# Patient Record
Sex: FEMALE | Race: WHITE | NOT HISPANIC OR LATINO | Employment: OTHER | URBAN - METROPOLITAN AREA
[De-identification: names, ages, dates, MRNs, and addresses within clinical notes are randomized per-mention and may not be internally consistent; named-entity substitution may affect disease eponyms.]

---

## 2017-02-24 ENCOUNTER — CONVERSION ENCOUNTER (OUTPATIENT)
Dept: ORTHOPEDIC SURGERY | Facility: CLINIC | Age: 62
End: 2017-02-24

## 2019-06-04 VITALS — HEART RATE: 84 BPM | WEIGHT: 135 LBS | SYSTOLIC BLOOD PRESSURE: 127 MMHG | DIASTOLIC BLOOD PRESSURE: 82 MMHG

## 2019-12-12 ENCOUNTER — ON CAMPUS - OUTPATIENT (AMBULATORY)
Dept: URBAN - METROPOLITAN AREA HOSPITAL 2 | Facility: HOSPITAL | Age: 64
End: 2019-12-12

## 2019-12-12 ENCOUNTER — OFFICE (AMBULATORY)
Dept: URBAN - METROPOLITAN AREA PATHOLOGY 4 | Facility: PATHOLOGY | Age: 64
End: 2019-12-12

## 2019-12-12 VITALS
HEART RATE: 78 BPM | DIASTOLIC BLOOD PRESSURE: 74 MMHG | SYSTOLIC BLOOD PRESSURE: 98 MMHG | TEMPERATURE: 98.2 F | SYSTOLIC BLOOD PRESSURE: 115 MMHG | SYSTOLIC BLOOD PRESSURE: 131 MMHG | HEART RATE: 88 BPM | DIASTOLIC BLOOD PRESSURE: 75 MMHG | OXYGEN SATURATION: 98 % | OXYGEN SATURATION: 99 % | HEIGHT: 63 IN | HEART RATE: 72 BPM | RESPIRATION RATE: 16 BRPM | SYSTOLIC BLOOD PRESSURE: 107 MMHG | WEIGHT: 150 LBS | SYSTOLIC BLOOD PRESSURE: 110 MMHG | DIASTOLIC BLOOD PRESSURE: 61 MMHG | OXYGEN SATURATION: 97 % | RESPIRATION RATE: 17 BRPM | HEART RATE: 77 BPM | HEART RATE: 90 BPM | DIASTOLIC BLOOD PRESSURE: 84 MMHG | SYSTOLIC BLOOD PRESSURE: 101 MMHG | DIASTOLIC BLOOD PRESSURE: 65 MMHG | OXYGEN SATURATION: 96 %

## 2019-12-12 DIAGNOSIS — D12.2 BENIGN NEOPLASM OF ASCENDING COLON: ICD-10-CM

## 2019-12-12 DIAGNOSIS — Z86.010 PERSONAL HISTORY OF COLONIC POLYPS: ICD-10-CM

## 2019-12-12 LAB
GI HISTOLOGY: A. UNSPECIFIED: (no result)
GI HISTOLOGY: PDF REPORT: (no result)

## 2019-12-12 PROCEDURE — 45385 COLONOSCOPY W/LESION REMOVAL: CPT | Mod: PT | Performed by: INTERNAL MEDICINE

## 2019-12-12 PROCEDURE — 88305 TISSUE EXAM BY PATHOLOGIST: CPT | Mod: 26 | Performed by: INTERNAL MEDICINE

## 2019-12-12 PROCEDURE — 88305 TISSUE EXAM BY PATHOLOGIST: CPT | Performed by: INTERNAL MEDICINE

## 2019-12-13 ENCOUNTER — LAB REQUISITION (OUTPATIENT)
Dept: LAB | Facility: HOSPITAL | Age: 64
End: 2019-12-13

## 2019-12-13 DIAGNOSIS — Z86.010 PERSONAL HISTORY OF COLONIC POLYPS: ICD-10-CM

## 2019-12-16 LAB
LAB AP CASE REPORT: NORMAL
PATH REPORT.FINAL DX SPEC: NORMAL
PATH REPORT.GROSS SPEC: NORMAL

## 2020-10-29 ENCOUNTER — LAB (OUTPATIENT)
Dept: LAB | Facility: HOSPITAL | Age: 65
End: 2020-10-29

## 2020-10-29 ENCOUNTER — TRANSCRIBE ORDERS (OUTPATIENT)
Dept: ADMINISTRATIVE | Facility: HOSPITAL | Age: 65
End: 2020-10-29

## 2020-10-29 DIAGNOSIS — G47.33 OBSTRUCTIVE SLEEP APNEA (ADULT) (PEDIATRIC): ICD-10-CM

## 2020-10-29 DIAGNOSIS — Z90.5 ACQUIRED ABSENCE OF KIDNEY: ICD-10-CM

## 2020-10-29 DIAGNOSIS — R53.83 TIREDNESS: ICD-10-CM

## 2020-10-29 DIAGNOSIS — E78.81 LIPOID DERMATOARTHRITIS: Primary | ICD-10-CM

## 2020-10-29 DIAGNOSIS — E55.9 VITAMIN D DEFICIENCY: ICD-10-CM

## 2020-10-29 DIAGNOSIS — F34.1 DYSTHYMIC DISORDER: ICD-10-CM

## 2020-10-29 DIAGNOSIS — E78.81 LIPOID DERMATOARTHRITIS: ICD-10-CM

## 2020-10-29 LAB
25(OH)D3 SERPL-MCNC: 52.1 NG/ML (ref 30–100)
ALBUMIN SERPL-MCNC: 4.7 G/DL (ref 3.5–5.2)
ALBUMIN/GLOB SERPL: 2.4 G/DL
ALP SERPL-CCNC: 84 U/L (ref 39–117)
ALT SERPL W P-5'-P-CCNC: 16 U/L (ref 1–33)
ANION GAP SERPL CALCULATED.3IONS-SCNC: 7.7 MMOL/L (ref 5–15)
AST SERPL-CCNC: 24 U/L (ref 1–32)
BILIRUB SERPL-MCNC: 0.3 MG/DL (ref 0–1.2)
BUN SERPL-MCNC: 10 MG/DL (ref 8–23)
BUN/CREAT SERPL: 10.8 (ref 7–25)
CALCIUM SPEC-SCNC: 10 MG/DL (ref 8.6–10.5)
CHLORIDE SERPL-SCNC: 102 MMOL/L (ref 98–107)
CHOLEST SERPL-MCNC: 211 MG/DL (ref 0–200)
CO2 SERPL-SCNC: 31.3 MMOL/L (ref 22–29)
CREAT SERPL-MCNC: 0.93 MG/DL (ref 0.57–1)
DEPRECATED RDW RBC AUTO: 38.5 FL (ref 37–54)
ERYTHROCYTE [DISTWIDTH] IN BLOOD BY AUTOMATED COUNT: 11.9 % (ref 12.3–15.4)
GFR SERPL CREATININE-BSD FRML MDRD: 60 ML/MIN/1.73
GLOBULIN UR ELPH-MCNC: 2 GM/DL
GLUCOSE SERPL-MCNC: 90 MG/DL (ref 65–99)
HCT VFR BLD AUTO: 36.8 % (ref 34–46.6)
HDLC SERPL-MCNC: 58 MG/DL (ref 40–60)
HGB BLD-MCNC: 12 G/DL (ref 12–15.9)
IRON 24H UR-MRATE: 59 MCG/DL (ref 37–145)
LDLC SERPL CALC-MCNC: 119 MG/DL (ref 0–100)
LDLC/HDLC SERPL: 1.97 {RATIO}
MAGNESIUM SERPL-MCNC: 2.3 MG/DL (ref 1.6–2.4)
MCH RBC QN AUTO: 29.1 PG (ref 26.6–33)
MCHC RBC AUTO-ENTMCNC: 32.6 G/DL (ref 31.5–35.7)
MCV RBC AUTO: 89.1 FL (ref 79–97)
PLATELET # BLD AUTO: 330 10*3/MM3 (ref 140–450)
PMV BLD AUTO: 9.6 FL (ref 6–12)
POTASSIUM SERPL-SCNC: 4.7 MMOL/L (ref 3.5–5.2)
PROT SERPL-MCNC: 6.7 G/DL (ref 6–8.5)
RBC # BLD AUTO: 4.13 10*6/MM3 (ref 3.77–5.28)
SODIUM SERPL-SCNC: 141 MMOL/L (ref 136–145)
TRIGL SERPL-MCNC: 195 MG/DL (ref 0–150)
TSH SERPL DL<=0.05 MIU/L-ACNC: 2.06 UIU/ML (ref 0.27–4.2)
VIT B12 BLD-MCNC: 844 PG/ML (ref 211–946)
VLDLC SERPL-MCNC: 34 MG/DL (ref 5–40)
WBC # BLD AUTO: 6.98 10*3/MM3 (ref 3.4–10.8)

## 2020-10-29 PROCEDURE — 85027 COMPLETE CBC AUTOMATED: CPT

## 2020-10-29 PROCEDURE — 82607 VITAMIN B-12: CPT

## 2020-10-29 PROCEDURE — 84443 ASSAY THYROID STIM HORMONE: CPT

## 2020-10-29 PROCEDURE — 83540 ASSAY OF IRON: CPT

## 2020-10-29 PROCEDURE — 82306 VITAMIN D 25 HYDROXY: CPT

## 2020-10-29 PROCEDURE — 80053 COMPREHEN METABOLIC PANEL: CPT

## 2020-10-29 PROCEDURE — 83735 ASSAY OF MAGNESIUM: CPT

## 2020-10-29 PROCEDURE — 80061 LIPID PANEL: CPT

## 2020-10-29 PROCEDURE — 36415 COLL VENOUS BLD VENIPUNCTURE: CPT

## 2021-08-23 ENCOUNTER — TELEPHONE (OUTPATIENT)
Dept: NEUROLOGY | Facility: CLINIC | Age: 66
End: 2021-08-23

## 2022-01-29 ENCOUNTER — LAB (OUTPATIENT)
Dept: LAB | Facility: HOSPITAL | Age: 67
End: 2022-01-29

## 2022-01-29 ENCOUNTER — TRANSCRIBE ORDERS (OUTPATIENT)
Dept: ADMINISTRATIVE | Facility: HOSPITAL | Age: 67
End: 2022-01-29

## 2022-01-29 DIAGNOSIS — E78.81 LIPOID DERMATOARTHRITIS: ICD-10-CM

## 2022-01-29 DIAGNOSIS — Z90.5 SINGLE KIDNEY: ICD-10-CM

## 2022-01-29 DIAGNOSIS — F34.1 DYSTHYMIC DISORDER: ICD-10-CM

## 2022-01-29 DIAGNOSIS — E55.9 VITAMIN D DEFICIENCY: Primary | ICD-10-CM

## 2022-01-29 DIAGNOSIS — E55.9 VITAMIN D DEFICIENCY: ICD-10-CM

## 2022-01-29 LAB
25(OH)D3 SERPL-MCNC: 38.1 NG/ML (ref 30–100)
ALBUMIN SERPL-MCNC: 4.5 G/DL (ref 3.5–5.2)
ALBUMIN/GLOB SERPL: 2 G/DL
ALP SERPL-CCNC: 85 U/L (ref 39–117)
ALT SERPL W P-5'-P-CCNC: 15 U/L (ref 1–33)
ANION GAP SERPL CALCULATED.3IONS-SCNC: 10.8 MMOL/L (ref 5–15)
AST SERPL-CCNC: 19 U/L (ref 1–32)
BASOPHILS # BLD AUTO: 0.05 10*3/MM3 (ref 0–0.2)
BASOPHILS NFR BLD AUTO: 0.8 % (ref 0–1.5)
BILIRUB SERPL-MCNC: 0.2 MG/DL (ref 0–1.2)
BUN SERPL-MCNC: 10 MG/DL (ref 8–23)
BUN/CREAT SERPL: 9.3 (ref 7–25)
CALCIUM SPEC-SCNC: 9.8 MG/DL (ref 8.6–10.5)
CHLORIDE SERPL-SCNC: 102 MMOL/L (ref 98–107)
CHOLEST SERPL-MCNC: 213 MG/DL (ref 0–200)
CO2 SERPL-SCNC: 28.2 MMOL/L (ref 22–29)
CREAT SERPL-MCNC: 1.07 MG/DL (ref 0.57–1)
DEPRECATED RDW RBC AUTO: 36.6 FL (ref 37–54)
EOSINOPHIL # BLD AUTO: 0.2 10*3/MM3 (ref 0–0.4)
EOSINOPHIL NFR BLD AUTO: 3.2 % (ref 0.3–6.2)
ERYTHROCYTE [DISTWIDTH] IN BLOOD BY AUTOMATED COUNT: 11.4 % (ref 12.3–15.4)
GFR SERPL CREATININE-BSD FRML MDRD: 51 ML/MIN/1.73
GLOBULIN UR ELPH-MCNC: 2.2 GM/DL
GLUCOSE SERPL-MCNC: 81 MG/DL (ref 65–99)
HCT VFR BLD AUTO: 38.7 % (ref 34–46.6)
HDLC SERPL-MCNC: 48 MG/DL (ref 40–60)
HGB BLD-MCNC: 13.1 G/DL (ref 12–15.9)
IMM GRANULOCYTES # BLD AUTO: 0.02 10*3/MM3 (ref 0–0.05)
IMM GRANULOCYTES NFR BLD AUTO: 0.3 % (ref 0–0.5)
LDLC SERPL CALC-MCNC: 106 MG/DL (ref 0–100)
LDLC/HDLC SERPL: 1.99 {RATIO}
LYMPHOCYTES # BLD AUTO: 2.27 10*3/MM3 (ref 0.7–3.1)
LYMPHOCYTES NFR BLD AUTO: 36.4 % (ref 19.6–45.3)
MCH RBC QN AUTO: 30 PG (ref 26.6–33)
MCHC RBC AUTO-ENTMCNC: 33.9 G/DL (ref 31.5–35.7)
MCV RBC AUTO: 88.6 FL (ref 79–97)
MONOCYTES # BLD AUTO: 0.5 10*3/MM3 (ref 0.1–0.9)
MONOCYTES NFR BLD AUTO: 8 % (ref 5–12)
NEUTROPHILS NFR BLD AUTO: 3.19 10*3/MM3 (ref 1.7–7)
NEUTROPHILS NFR BLD AUTO: 51.3 % (ref 42.7–76)
NRBC BLD AUTO-RTO: 0 /100 WBC (ref 0–0.2)
PLATELET # BLD AUTO: 325 10*3/MM3 (ref 140–450)
PMV BLD AUTO: 9.8 FL (ref 6–12)
POTASSIUM SERPL-SCNC: 4.3 MMOL/L (ref 3.5–5.2)
PROT SERPL-MCNC: 6.7 G/DL (ref 6–8.5)
RBC # BLD AUTO: 4.37 10*6/MM3 (ref 3.77–5.28)
SODIUM SERPL-SCNC: 141 MMOL/L (ref 136–145)
TRIGL SERPL-MCNC: 347 MG/DL (ref 0–150)
VLDLC SERPL-MCNC: 59 MG/DL (ref 5–40)
WBC NRBC COR # BLD: 6.23 10*3/MM3 (ref 3.4–10.8)

## 2022-01-29 PROCEDURE — 82306 VITAMIN D 25 HYDROXY: CPT

## 2022-01-29 PROCEDURE — 85025 COMPLETE CBC W/AUTO DIFF WBC: CPT

## 2022-01-29 PROCEDURE — 36415 COLL VENOUS BLD VENIPUNCTURE: CPT

## 2022-01-29 PROCEDURE — 80061 LIPID PANEL: CPT

## 2022-01-29 PROCEDURE — 80053 COMPREHEN METABOLIC PANEL: CPT

## 2022-06-17 ENCOUNTER — TRANSCRIBE ORDERS (OUTPATIENT)
Dept: ADMINISTRATIVE | Facility: HOSPITAL | Age: 67
End: 2022-06-17

## 2022-06-17 DIAGNOSIS — Z87.442 HISTORY OF KIDNEY STONES: ICD-10-CM

## 2022-06-17 DIAGNOSIS — M54.41 ACUTE BILATERAL LOW BACK PAIN WITH RIGHT-SIDED SCIATICA: Primary | ICD-10-CM

## 2022-06-20 ENCOUNTER — HOSPITAL ENCOUNTER (OUTPATIENT)
Dept: CT IMAGING | Facility: HOSPITAL | Age: 67
Discharge: HOME | End: 2022-06-20
Admitting: FAMILY MEDICINE

## 2022-06-20 DIAGNOSIS — M54.41 ACUTE BILATERAL LOW BACK PAIN WITH RIGHT-SIDED SCIATICA: ICD-10-CM

## 2022-06-20 DIAGNOSIS — Z87.442 HISTORY OF KIDNEY STONES: ICD-10-CM

## 2022-06-20 LAB
CREAT BLDA-MCNC: 0.2 MG/DL (ref 0.6–1.3)
EGFRCR SERPLBLD CKD-EPI 2021: 127.6 ML/MIN/1.73

## 2022-06-20 PROCEDURE — 82565 ASSAY OF CREATININE: CPT

## 2022-06-20 PROCEDURE — 0 IOPAMIDOL PER 1 ML: Performed by: FAMILY MEDICINE

## 2022-06-20 PROCEDURE — 74177 CT ABD & PELVIS W/CONTRAST: CPT

## 2022-06-20 RX ADMIN — IOPAMIDOL 100 ML: 755 INJECTION, SOLUTION INTRAVENOUS at 12:53

## 2022-06-30 ENCOUNTER — TRANSCRIBE ORDERS (OUTPATIENT)
Dept: ADMINISTRATIVE | Facility: HOSPITAL | Age: 67
End: 2022-06-30

## 2022-06-30 ENCOUNTER — LAB (OUTPATIENT)
Dept: LAB | Facility: HOSPITAL | Age: 67
End: 2022-06-30

## 2022-06-30 ENCOUNTER — OFFICE (AMBULATORY)
Dept: URBAN - METROPOLITAN AREA CLINIC 64 | Facility: CLINIC | Age: 67
End: 2022-06-30

## 2022-06-30 VITALS
HEART RATE: 76 BPM | SYSTOLIC BLOOD PRESSURE: 116 MMHG | DIASTOLIC BLOOD PRESSURE: 67 MMHG | HEIGHT: 63 IN | WEIGHT: 159 LBS

## 2022-06-30 DIAGNOSIS — Z90.5 ACQUIRED ABSENCE OF KIDNEY: ICD-10-CM

## 2022-06-30 DIAGNOSIS — G47.33 OBSTRUCTIVE SLEEP APNEA (ADULT) (PEDIATRIC): ICD-10-CM

## 2022-06-30 DIAGNOSIS — M86.9 SAPHO SYNDROME: ICD-10-CM

## 2022-06-30 DIAGNOSIS — E78.81 LIPOID DERMATOARTHRITIS: ICD-10-CM

## 2022-06-30 DIAGNOSIS — J45.909 ALLERGIC ASTHMA WITH STATED CAUSE: ICD-10-CM

## 2022-06-30 DIAGNOSIS — E55.9 AVITAMINOSIS D: ICD-10-CM

## 2022-06-30 DIAGNOSIS — L40.3 SAPHO SYNDROME: ICD-10-CM

## 2022-06-30 DIAGNOSIS — M65.9 SAPHO SYNDROME: ICD-10-CM

## 2022-06-30 DIAGNOSIS — M85.80 SAPHO SYNDROME: ICD-10-CM

## 2022-06-30 DIAGNOSIS — Z00.00 ROUTINE GENERAL MEDICAL EXAMINATION AT A HEALTH CARE FACILITY: ICD-10-CM

## 2022-06-30 DIAGNOSIS — F34.1 DYSTHYMIC DISORDER: ICD-10-CM

## 2022-06-30 DIAGNOSIS — K76.0 FATTY (CHANGE OF) LIVER, NOT ELSEWHERE CLASSIFIED: ICD-10-CM

## 2022-06-30 DIAGNOSIS — E55.9 AVITAMINOSIS D: Primary | ICD-10-CM

## 2022-06-30 DIAGNOSIS — L70.9 SAPHO SYNDROME: ICD-10-CM

## 2022-06-30 LAB
25(OH)D3 SERPL-MCNC: 55.3 NG/ML (ref 30–100)
ALBUMIN SERPL-MCNC: 4.5 G/DL (ref 3.5–5.2)
ALBUMIN/GLOB SERPL: 2.1 G/DL
ALP SERPL-CCNC: 88 U/L (ref 39–117)
ALT SERPL W P-5'-P-CCNC: 11 U/L (ref 1–33)
ANION GAP SERPL CALCULATED.3IONS-SCNC: 7.9 MMOL/L (ref 5–15)
AST SERPL-CCNC: 13 U/L (ref 1–32)
BASOPHILS # BLD AUTO: 0.06 10*3/MM3 (ref 0–0.2)
BASOPHILS NFR BLD AUTO: 1.1 % (ref 0–1.5)
BILIRUB SERPL-MCNC: 0.3 MG/DL (ref 0–1.2)
BUN SERPL-MCNC: 12 MG/DL (ref 8–23)
BUN/CREAT SERPL: 13 (ref 7–25)
CALCIUM SPEC-SCNC: 10.2 MG/DL (ref 8.6–10.5)
CHLORIDE SERPL-SCNC: 104 MMOL/L (ref 98–107)
CHOLEST SERPL-MCNC: 220 MG/DL (ref 0–200)
CO2 SERPL-SCNC: 28.1 MMOL/L (ref 22–29)
CREAT SERPL-MCNC: 0.92 MG/DL (ref 0.57–1)
DEPRECATED RDW RBC AUTO: 36.6 FL (ref 37–54)
EGFRCR SERPLBLD CKD-EPI 2021: 68 ML/MIN/1.73
EOSINOPHIL # BLD AUTO: 0.27 10*3/MM3 (ref 0–0.4)
EOSINOPHIL NFR BLD AUTO: 4.8 % (ref 0.3–6.2)
ERYTHROCYTE [DISTWIDTH] IN BLOOD BY AUTOMATED COUNT: 11.5 % (ref 12.3–15.4)
GLOBULIN UR ELPH-MCNC: 2.1 GM/DL
GLUCOSE SERPL-MCNC: 83 MG/DL (ref 65–99)
HCT VFR BLD AUTO: 37.5 % (ref 34–46.6)
HDLC SERPL-MCNC: 50 MG/DL (ref 40–60)
HGB BLD-MCNC: 12.5 G/DL (ref 12–15.9)
IMM GRANULOCYTES # BLD AUTO: 0.02 10*3/MM3 (ref 0–0.05)
IMM GRANULOCYTES NFR BLD AUTO: 0.4 % (ref 0–0.5)
LDLC SERPL CALC-MCNC: 135 MG/DL (ref 0–100)
LDLC/HDLC SERPL: 2.61 {RATIO}
LYMPHOCYTES # BLD AUTO: 1.98 10*3/MM3 (ref 0.7–3.1)
LYMPHOCYTES NFR BLD AUTO: 35.2 % (ref 19.6–45.3)
MCH RBC QN AUTO: 29.8 PG (ref 26.6–33)
MCHC RBC AUTO-ENTMCNC: 33.3 G/DL (ref 31.5–35.7)
MCV RBC AUTO: 89.3 FL (ref 79–97)
MONOCYTES # BLD AUTO: 0.6 10*3/MM3 (ref 0.1–0.9)
MONOCYTES NFR BLD AUTO: 10.7 % (ref 5–12)
NEUTROPHILS NFR BLD AUTO: 2.69 10*3/MM3 (ref 1.7–7)
NEUTROPHILS NFR BLD AUTO: 47.8 % (ref 42.7–76)
NRBC BLD AUTO-RTO: 0 /100 WBC (ref 0–0.2)
PLATELET # BLD AUTO: 282 10*3/MM3 (ref 140–450)
PMV BLD AUTO: 9.7 FL (ref 6–12)
POTASSIUM SERPL-SCNC: 4.7 MMOL/L (ref 3.5–5.2)
PROT SERPL-MCNC: 6.6 G/DL (ref 6–8.5)
RBC # BLD AUTO: 4.2 10*6/MM3 (ref 3.77–5.28)
SODIUM SERPL-SCNC: 140 MMOL/L (ref 136–145)
TRIGL SERPL-MCNC: 197 MG/DL (ref 0–150)
TSH SERPL DL<=0.05 MIU/L-ACNC: 2.83 UIU/ML (ref 0.27–4.2)
VLDLC SERPL-MCNC: 35 MG/DL (ref 5–40)
WBC NRBC COR # BLD: 5.62 10*3/MM3 (ref 3.4–10.8)

## 2022-06-30 PROCEDURE — 80061 LIPID PANEL: CPT

## 2022-06-30 PROCEDURE — 36415 COLL VENOUS BLD VENIPUNCTURE: CPT

## 2022-06-30 PROCEDURE — 80053 COMPREHEN METABOLIC PANEL: CPT

## 2022-06-30 PROCEDURE — 85025 COMPLETE CBC W/AUTO DIFF WBC: CPT

## 2022-06-30 PROCEDURE — 99213 OFFICE O/P EST LOW 20 MIN: CPT | Performed by: NURSE PRACTITIONER

## 2022-06-30 PROCEDURE — 84443 ASSAY THYROID STIM HORMONE: CPT

## 2022-06-30 PROCEDURE — 82306 VITAMIN D 25 HYDROXY: CPT

## 2023-05-01 ENCOUNTER — TRANSCRIBE ORDERS (OUTPATIENT)
Dept: ADMINISTRATIVE | Facility: HOSPITAL | Age: 68
End: 2023-05-01
Payer: MEDICARE

## 2023-05-01 ENCOUNTER — LAB (OUTPATIENT)
Dept: LAB | Facility: HOSPITAL | Age: 68
End: 2023-05-01
Payer: MEDICARE

## 2023-05-01 DIAGNOSIS — G47.33 OBSTRUCTIVE SLEEP APNEA (ADULT) (PEDIATRIC): ICD-10-CM

## 2023-05-01 DIAGNOSIS — N95.1 SYMPTOMATIC MENOPAUSAL OR FEMALE CLIMACTERIC STATES: ICD-10-CM

## 2023-05-01 DIAGNOSIS — E78.81 LIPOID DERMATOARTHRITIS: ICD-10-CM

## 2023-05-01 DIAGNOSIS — E55.9 AVITAMINOSIS D: Primary | ICD-10-CM

## 2023-05-01 DIAGNOSIS — F34.1 DYSTHYMIC DISORDER: ICD-10-CM

## 2023-05-01 DIAGNOSIS — E55.9 AVITAMINOSIS D: ICD-10-CM

## 2023-05-01 DIAGNOSIS — Z90.5 ACQUIRED ABSENCE OF KIDNEY: ICD-10-CM

## 2023-05-01 LAB
25(OH)D3 SERPL-MCNC: 49.3 NG/ML (ref 30–100)
ALBUMIN SERPL-MCNC: 4.5 G/DL (ref 3.5–5.2)
ALBUMIN/GLOB SERPL: 1.9 G/DL
ALP SERPL-CCNC: 91 U/L (ref 39–117)
ALT SERPL W P-5'-P-CCNC: 13 U/L (ref 1–33)
ANION GAP SERPL CALCULATED.3IONS-SCNC: 8 MMOL/L (ref 5–15)
AST SERPL-CCNC: 22 U/L (ref 1–32)
BASOPHILS # BLD AUTO: 0 10*3/MM3 (ref 0–0.2)
BASOPHILS NFR BLD AUTO: 0.6 % (ref 0–1.5)
BILIRUB SERPL-MCNC: 0.5 MG/DL (ref 0–1.2)
BUN SERPL-MCNC: 10 MG/DL (ref 8–23)
BUN/CREAT SERPL: 10.3 (ref 7–25)
CALCIUM SPEC-SCNC: 9.8 MG/DL (ref 8.6–10.5)
CHLORIDE SERPL-SCNC: 103 MMOL/L (ref 98–107)
CHOLEST SERPL-MCNC: 224 MG/DL (ref 0–200)
CO2 SERPL-SCNC: 31 MMOL/L (ref 22–29)
CREAT SERPL-MCNC: 0.97 MG/DL (ref 0.57–1)
DEPRECATED RDW RBC AUTO: 40.3 FL (ref 37–54)
EGFRCR SERPLBLD CKD-EPI 2021: 63.4 ML/MIN/1.73
EOSINOPHIL # BLD AUTO: 0.2 10*3/MM3 (ref 0–0.4)
EOSINOPHIL NFR BLD AUTO: 2.7 % (ref 0.3–6.2)
ERYTHROCYTE [DISTWIDTH] IN BLOOD BY AUTOMATED COUNT: 12.8 % (ref 12.3–15.4)
GLOBULIN UR ELPH-MCNC: 2.4 GM/DL
GLUCOSE SERPL-MCNC: 96 MG/DL (ref 65–99)
HCT VFR BLD AUTO: 39.2 % (ref 34–46.6)
HDLC SERPL-MCNC: 53 MG/DL (ref 40–60)
HGB BLD-MCNC: 12.8 G/DL (ref 12–15.9)
LDLC SERPL CALC-MCNC: 136 MG/DL (ref 0–100)
LDLC/HDLC SERPL: 2.49 {RATIO}
LYMPHOCYTES # BLD AUTO: 1.9 10*3/MM3 (ref 0.7–3.1)
LYMPHOCYTES NFR BLD AUTO: 30.2 % (ref 19.6–45.3)
MCH RBC QN AUTO: 29.8 PG (ref 26.6–33)
MCHC RBC AUTO-ENTMCNC: 32.6 G/DL (ref 31.5–35.7)
MCV RBC AUTO: 91.4 FL (ref 79–97)
MONOCYTES # BLD AUTO: 0.5 10*3/MM3 (ref 0.1–0.9)
MONOCYTES NFR BLD AUTO: 8.2 % (ref 5–12)
NEUTROPHILS NFR BLD AUTO: 3.6 10*3/MM3 (ref 1.7–7)
NEUTROPHILS NFR BLD AUTO: 58.3 % (ref 42.7–76)
NRBC BLD AUTO-RTO: 0 /100 WBC (ref 0–0.2)
PLATELET # BLD AUTO: 325 10*3/MM3 (ref 140–450)
PMV BLD AUTO: 7.6 FL (ref 6–12)
POTASSIUM SERPL-SCNC: 4.5 MMOL/L (ref 3.5–5.2)
PROT SERPL-MCNC: 6.9 G/DL (ref 6–8.5)
RBC # BLD AUTO: 4.29 10*6/MM3 (ref 3.77–5.28)
SODIUM SERPL-SCNC: 142 MMOL/L (ref 136–145)
TRIGL SERPL-MCNC: 195 MG/DL (ref 0–150)
TSH SERPL DL<=0.05 MIU/L-ACNC: 1.99 UIU/ML (ref 0.27–4.2)
VLDLC SERPL-MCNC: 35 MG/DL (ref 5–40)
WBC NRBC COR # BLD: 6.3 10*3/MM3 (ref 3.4–10.8)

## 2023-05-01 PROCEDURE — 36415 COLL VENOUS BLD VENIPUNCTURE: CPT

## 2023-05-01 PROCEDURE — 80053 COMPREHEN METABOLIC PANEL: CPT

## 2023-05-01 PROCEDURE — 80061 LIPID PANEL: CPT

## 2023-05-01 PROCEDURE — 84443 ASSAY THYROID STIM HORMONE: CPT

## 2023-05-01 PROCEDURE — 82306 VITAMIN D 25 HYDROXY: CPT

## 2023-05-01 PROCEDURE — 85025 COMPLETE CBC W/AUTO DIFF WBC: CPT

## 2023-11-15 ENCOUNTER — TRANSCRIBE ORDERS (OUTPATIENT)
Dept: LAB | Facility: HOSPITAL | Age: 68
End: 2023-11-15
Payer: MEDICARE

## 2023-11-15 ENCOUNTER — LAB (OUTPATIENT)
Dept: LAB | Facility: HOSPITAL | Age: 68
End: 2023-11-15
Payer: MEDICARE

## 2023-11-15 DIAGNOSIS — E78.81 LIPOID DERMATOARTHRITIS: ICD-10-CM

## 2023-11-15 DIAGNOSIS — T78.40XA DILATED CARDIOMYOPATHY SECONDARY TO SENSITIVITY: ICD-10-CM

## 2023-11-15 DIAGNOSIS — I43 DILATED CARDIOMYOPATHY SECONDARY TO SENSITIVITY: ICD-10-CM

## 2023-11-15 DIAGNOSIS — E55.9 VITAMIN D DEFICIENCY: Primary | ICD-10-CM

## 2023-11-15 DIAGNOSIS — G47.33 OBSTRUCTIVE SLEEP APNEA (ADULT) (PEDIATRIC): ICD-10-CM

## 2023-11-15 DIAGNOSIS — M85.80 BONE LOSS: ICD-10-CM

## 2023-11-15 DIAGNOSIS — Z90.5 ACQUIRED ABSENCE OF KIDNEY: ICD-10-CM

## 2023-11-15 DIAGNOSIS — J45.909 ALLERGIC ASTHMA WITH STATED CAUSE: ICD-10-CM

## 2023-11-15 DIAGNOSIS — E55.9 VITAMIN D DEFICIENCY: ICD-10-CM

## 2023-11-15 LAB
25(OH)D3 SERPL-MCNC: 53.1 NG/ML (ref 30–100)
ALBUMIN SERPL-MCNC: 4.5 G/DL (ref 3.5–5.2)
ALBUMIN/GLOB SERPL: 2.3 G/DL
ALP SERPL-CCNC: 78 U/L (ref 39–117)
ALT SERPL W P-5'-P-CCNC: 15 U/L (ref 1–33)
ANION GAP SERPL CALCULATED.3IONS-SCNC: 8 MMOL/L (ref 5–15)
AST SERPL-CCNC: 22 U/L (ref 1–32)
BASOPHILS # BLD AUTO: 0.07 10*3/MM3 (ref 0–0.2)
BASOPHILS NFR BLD AUTO: 1.1 % (ref 0–1.5)
BILIRUB SERPL-MCNC: 0.2 MG/DL (ref 0–1.2)
BUN SERPL-MCNC: 12 MG/DL (ref 8–23)
BUN/CREAT SERPL: 15 (ref 7–25)
CALCIUM SPEC-SCNC: 9.7 MG/DL (ref 8.6–10.5)
CHLORIDE SERPL-SCNC: 103 MMOL/L (ref 98–107)
CHOLEST SERPL-MCNC: 203 MG/DL (ref 0–200)
CO2 SERPL-SCNC: 29 MMOL/L (ref 22–29)
CREAT SERPL-MCNC: 0.8 MG/DL (ref 0.57–1)
DEPRECATED RDW RBC AUTO: 39.6 FL (ref 37–54)
EGFRCR SERPLBLD CKD-EPI 2021: 79.4 ML/MIN/1.73
EOSINOPHIL # BLD AUTO: 0.3 10*3/MM3 (ref 0–0.4)
EOSINOPHIL NFR BLD AUTO: 4.9 % (ref 0.3–6.2)
ERYTHROCYTE [DISTWIDTH] IN BLOOD BY AUTOMATED COUNT: 11.8 % (ref 12.3–15.4)
GLOBULIN UR ELPH-MCNC: 2 GM/DL
GLUCOSE SERPL-MCNC: 91 MG/DL (ref 65–99)
HCT VFR BLD AUTO: 37.2 % (ref 34–46.6)
HDLC SERPL-MCNC: 61 MG/DL (ref 40–60)
HGB BLD-MCNC: 12.6 G/DL (ref 12–15.9)
IMM GRANULOCYTES # BLD AUTO: 0.02 10*3/MM3 (ref 0–0.05)
IMM GRANULOCYTES NFR BLD AUTO: 0.3 % (ref 0–0.5)
LDLC SERPL CALC-MCNC: 121 MG/DL (ref 0–100)
LDLC/HDLC SERPL: 1.93 {RATIO}
LYMPHOCYTES # BLD AUTO: 2.12 10*3/MM3 (ref 0.7–3.1)
LYMPHOCYTES NFR BLD AUTO: 34.8 % (ref 19.6–45.3)
MCH RBC QN AUTO: 30.7 PG (ref 26.6–33)
MCHC RBC AUTO-ENTMCNC: 33.9 G/DL (ref 31.5–35.7)
MCV RBC AUTO: 90.7 FL (ref 79–97)
MONOCYTES # BLD AUTO: 0.56 10*3/MM3 (ref 0.1–0.9)
MONOCYTES NFR BLD AUTO: 9.2 % (ref 5–12)
NEUTROPHILS NFR BLD AUTO: 3.02 10*3/MM3 (ref 1.7–7)
NEUTROPHILS NFR BLD AUTO: 49.7 % (ref 42.7–76)
NRBC BLD AUTO-RTO: 0 /100 WBC (ref 0–0.2)
PLATELET # BLD AUTO: 304 10*3/MM3 (ref 140–450)
PMV BLD AUTO: 9.7 FL (ref 6–12)
POTASSIUM SERPL-SCNC: 4.4 MMOL/L (ref 3.5–5.2)
PROT SERPL-MCNC: 6.5 G/DL (ref 6–8.5)
RBC # BLD AUTO: 4.1 10*6/MM3 (ref 3.77–5.28)
SODIUM SERPL-SCNC: 140 MMOL/L (ref 136–145)
TRIGL SERPL-MCNC: 121 MG/DL (ref 0–150)
TSH SERPL DL<=0.05 MIU/L-ACNC: 2.65 UIU/ML (ref 0.27–4.2)
VLDLC SERPL-MCNC: 21 MG/DL (ref 5–40)
WBC NRBC COR # BLD: 6.09 10*3/MM3 (ref 3.4–10.8)

## 2023-11-15 PROCEDURE — 82306 VITAMIN D 25 HYDROXY: CPT

## 2023-11-15 PROCEDURE — 84443 ASSAY THYROID STIM HORMONE: CPT

## 2023-11-15 PROCEDURE — 85025 COMPLETE CBC W/AUTO DIFF WBC: CPT

## 2023-11-15 PROCEDURE — 80061 LIPID PANEL: CPT

## 2023-11-15 PROCEDURE — 36415 COLL VENOUS BLD VENIPUNCTURE: CPT

## 2023-11-15 PROCEDURE — 80053 COMPREHEN METABOLIC PANEL: CPT

## 2024-05-16 ENCOUNTER — HOSPITAL ENCOUNTER (INPATIENT)
Facility: HOSPITAL | Age: 69
LOS: 5 days | Discharge: NURSING, REHAB OR LONG-TERM CARE | End: 2024-05-21
Attending: EMERGENCY MEDICINE | Admitting: FAMILY MEDICINE
Payer: MEDICARE

## 2024-05-16 ENCOUNTER — APPOINTMENT (OUTPATIENT)
Dept: GENERAL RADIOLOGY | Facility: HOSPITAL | Age: 69
End: 2024-05-16
Payer: MEDICARE

## 2024-05-16 DIAGNOSIS — S72.011A CLOSED SUBCAPITAL FRACTURE OF RIGHT FEMUR, INITIAL ENCOUNTER: ICD-10-CM

## 2024-05-16 DIAGNOSIS — Z96.641 STATUS POST TOTAL HIP REPLACEMENT, RIGHT: Primary | ICD-10-CM
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
## 2024-05-16 PROBLEM — S72.001A CLOSED RIGHT HIP FRACTURE: Status: ACTIVE | Noted: 2024-05-16

## 2024-05-16 LAB
ABO GROUP BLD: NORMAL
ALBUMIN SERPL-MCNC: 4.3 G/DL (ref 3.5–5.2)
ALBUMIN/GLOB SERPL: 2 G/DL
ALP SERPL-CCNC: 90 U/L (ref 39–117)
ALT SERPL W P-5'-P-CCNC: 17 U/L (ref 1–33)
ANION GAP SERPL CALCULATED.3IONS-SCNC: 13 MMOL/L (ref 5–15)
APTT PPP: 21.7 SECONDS (ref 61–76.5)
AST SERPL-CCNC: 26 U/L (ref 1–32)
BASOPHILS # BLD AUTO: 0.04 10*3/MM3 (ref 0–0.2)
BASOPHILS NFR BLD AUTO: 0.4 % (ref 0–1.5)
BILIRUB SERPL-MCNC: 0.3 MG/DL (ref 0–1.2)
BLD GP AB SCN SERPL QL: NEGATIVE
BUN SERPL-MCNC: 12 MG/DL (ref 8–23)
BUN/CREAT SERPL: 15.2 (ref 7–25)
CALCIUM SPEC-SCNC: 9.1 MG/DL (ref 8.6–10.5)
CHLORIDE SERPL-SCNC: 103 MMOL/L (ref 98–107)
CO2 SERPL-SCNC: 23 MMOL/L (ref 22–29)
CREAT SERPL-MCNC: 0.79 MG/DL (ref 0.57–1)
DEPRECATED RDW RBC AUTO: 40.9 FL (ref 37–54)
EGFRCR SERPLBLD CKD-EPI 2021: 80.6 ML/MIN/1.73
EOSINOPHIL # BLD AUTO: 0.08 10*3/MM3 (ref 0–0.4)
EOSINOPHIL NFR BLD AUTO: 0.7 % (ref 0.3–6.2)
ERYTHROCYTE [DISTWIDTH] IN BLOOD BY AUTOMATED COUNT: 11.8 % (ref 12.3–15.4)
GLOBULIN UR ELPH-MCNC: 2.2 GM/DL
GLUCOSE SERPL-MCNC: 106 MG/DL (ref 65–99)
HCT VFR BLD AUTO: 34.5 % (ref 34–46.6)
HGB BLD-MCNC: 11 G/DL (ref 12–15.9)
IMM GRANULOCYTES # BLD AUTO: 0.06 10*3/MM3 (ref 0–0.05)
IMM GRANULOCYTES NFR BLD AUTO: 0.5 % (ref 0–0.5)
INR PPP: 0.94 (ref 0.93–1.1)
LYMPHOCYTES # BLD AUTO: 1.24 10*3/MM3 (ref 0.7–3.1)
LYMPHOCYTES NFR BLD AUTO: 11.3 % (ref 19.6–45.3)
MCH RBC QN AUTO: 29.9 PG (ref 26.6–33)
MCHC RBC AUTO-ENTMCNC: 31.9 G/DL (ref 31.5–35.7)
MCV RBC AUTO: 93.8 FL (ref 79–97)
MONOCYTES # BLD AUTO: 0.61 10*3/MM3 (ref 0.1–0.9)
MONOCYTES NFR BLD AUTO: 5.5 % (ref 5–12)
NEUTROPHILS NFR BLD AUTO: 8.98 10*3/MM3 (ref 1.7–7)
NEUTROPHILS NFR BLD AUTO: 81.6 % (ref 42.7–76)
NRBC BLD AUTO-RTO: 0 /100 WBC (ref 0–0.2)
PLATELET # BLD AUTO: 199 10*3/MM3 (ref 140–450)
PMV BLD AUTO: 9.6 FL (ref 6–12)
POTASSIUM SERPL-SCNC: 4 MMOL/L (ref 3.5–5.2)
PROT SERPL-MCNC: 6.5 G/DL (ref 6–8.5)
PROTHROMBIN TIME: 10.3 SECONDS (ref 9.6–11.7)
RBC # BLD AUTO: 3.68 10*6/MM3 (ref 3.77–5.28)
RH BLD: POSITIVE
SODIUM SERPL-SCNC: 139 MMOL/L (ref 136–145)
T&S EXPIRATION DATE: NORMAL
WBC NRBC COR # BLD AUTO: 11.01 10*3/MM3 (ref 3.4–10.8)

## 2024-05-16 PROCEDURE — 71045 X-RAY EXAM CHEST 1 VIEW: CPT

## 2024-05-16 PROCEDURE — 86850 RBC ANTIBODY SCREEN: CPT | Performed by: EMERGENCY MEDICINE

## 2024-05-16 PROCEDURE — 25810000003 SODIUM CHLORIDE 0.9 % SOLUTION: Performed by: FAMILY MEDICINE

## 2024-05-16 PROCEDURE — 99285 EMERGENCY DEPT VISIT HI MDM: CPT

## 2024-05-16 PROCEDURE — 25010000002 SODIUM CHLORIDE 0.9 % WITH KCL 20 MEQ 20-0.9 MEQ/L-% SOLUTION: Performed by: FAMILY MEDICINE

## 2024-05-16 PROCEDURE — 85730 THROMBOPLASTIN TIME PARTIAL: CPT | Performed by: EMERGENCY MEDICINE

## 2024-05-16 PROCEDURE — 25010000002 MORPHINE PER 10 MG: Performed by: FAMILY MEDICINE

## 2024-05-16 PROCEDURE — 93005 ELECTROCARDIOGRAM TRACING: CPT | Performed by: EMERGENCY MEDICINE

## 2024-05-16 PROCEDURE — 86901 BLOOD TYPING SEROLOGIC RH(D): CPT

## 2024-05-16 PROCEDURE — 25010000002 ONDANSETRON PER 1 MG: Performed by: FAMILY MEDICINE

## 2024-05-16 PROCEDURE — 80053 COMPREHEN METABOLIC PANEL: CPT | Performed by: EMERGENCY MEDICINE

## 2024-05-16 PROCEDURE — 86900 BLOOD TYPING SEROLOGIC ABO: CPT | Performed by: EMERGENCY MEDICINE

## 2024-05-16 PROCEDURE — 25010000002 MORPHINE PER 10 MG: Performed by: EMERGENCY MEDICINE

## 2024-05-16 PROCEDURE — 86901 BLOOD TYPING SEROLOGIC RH(D): CPT | Performed by: EMERGENCY MEDICINE

## 2024-05-16 PROCEDURE — 25010000002 ONDANSETRON PER 1 MG: Performed by: EMERGENCY MEDICINE

## 2024-05-16 PROCEDURE — 85025 COMPLETE CBC W/AUTO DIFF WBC: CPT | Performed by: EMERGENCY MEDICINE

## 2024-05-16 PROCEDURE — 73502 X-RAY EXAM HIP UNI 2-3 VIEWS: CPT

## 2024-05-16 PROCEDURE — 86900 BLOOD TYPING SEROLOGIC ABO: CPT

## 2024-05-16 PROCEDURE — 36415 COLL VENOUS BLD VENIPUNCTURE: CPT

## 2024-05-16 PROCEDURE — 85610 PROTHROMBIN TIME: CPT | Performed by: EMERGENCY MEDICINE

## 2024-05-16 RX ORDER — SODIUM CHLORIDE 0.9 % (FLUSH) 0.9 %
10 SYRINGE (ML) INJECTION AS NEEDED
Status: DISCONTINUED | OUTPATIENT
Start: 2024-05-16 | End: 2024-05-21 | Stop reason: HOSPADM

## 2024-05-16 RX ORDER — BISACODYL 10 MG
10 SUPPOSITORY, RECTAL RECTAL DAILY PRN
Status: DISCONTINUED | OUTPATIENT
Start: 2024-05-16 | End: 2024-05-21 | Stop reason: HOSPADM

## 2024-05-16 RX ORDER — AMOXICILLIN 250 MG
2 CAPSULE ORAL 2 TIMES DAILY PRN
Status: DISCONTINUED | OUTPATIENT
Start: 2024-05-16 | End: 2024-05-21 | Stop reason: HOSPADM

## 2024-05-16 RX ORDER — ACETAMINOPHEN 325 MG/1
650 TABLET ORAL EVERY 4 HOURS PRN
Status: DISCONTINUED | OUTPATIENT
Start: 2024-05-16 | End: 2024-05-21 | Stop reason: HOSPADM

## 2024-05-16 RX ORDER — POLYETHYLENE GLYCOL 3350 17 G/17G
17 POWDER, FOR SOLUTION ORAL DAILY PRN
Status: DISCONTINUED | OUTPATIENT
Start: 2024-05-16 | End: 2024-05-16

## 2024-05-16 RX ORDER — ONDANSETRON 2 MG/ML
4 INJECTION INTRAMUSCULAR; INTRAVENOUS EVERY 4 HOURS PRN
Status: DISCONTINUED | OUTPATIENT
Start: 2024-05-16 | End: 2024-05-21 | Stop reason: HOSPADM

## 2024-05-16 RX ORDER — POLYETHYLENE GLYCOL 3350 17 G/17G
17 POWDER, FOR SOLUTION ORAL DAILY PRN
Status: DISCONTINUED | OUTPATIENT
Start: 2024-05-16 | End: 2024-05-21 | Stop reason: HOSPADM

## 2024-05-16 RX ORDER — HYDROCODONE BITARTRATE AND ACETAMINOPHEN 7.5; 325 MG/1; MG/1
1 TABLET ORAL EVERY 6 HOURS PRN
Status: DISCONTINUED | OUTPATIENT
Start: 2024-05-16 | End: 2024-05-21 | Stop reason: HOSPADM

## 2024-05-16 RX ORDER — AMOXICILLIN 250 MG
2 CAPSULE ORAL 2 TIMES DAILY PRN
Status: DISCONTINUED | OUTPATIENT
Start: 2024-05-16 | End: 2024-05-16

## 2024-05-16 RX ORDER — NITROGLYCERIN 0.4 MG/1
0.4 TABLET SUBLINGUAL
Status: DISCONTINUED | OUTPATIENT
Start: 2024-05-16 | End: 2024-05-21 | Stop reason: HOSPADM

## 2024-05-16 RX ORDER — TRAMADOL HYDROCHLORIDE 50 MG/1
50 TABLET ORAL EVERY 6 HOURS PRN
Status: DISCONTINUED | OUTPATIENT
Start: 2024-05-16 | End: 2024-05-18

## 2024-05-16 RX ORDER — BISACODYL 5 MG/1
5 TABLET, DELAYED RELEASE ORAL DAILY PRN
Status: DISCONTINUED | OUTPATIENT
Start: 2024-05-16 | End: 2024-05-16

## 2024-05-16 RX ORDER — SODIUM CHLORIDE AND POTASSIUM CHLORIDE 150; 900 MG/100ML; MG/100ML
100 INJECTION, SOLUTION INTRAVENOUS CONTINUOUS
Status: DISCONTINUED | OUTPATIENT
Start: 2024-05-16 | End: 2024-05-18

## 2024-05-16 RX ORDER — ONDANSETRON 4 MG/1
4 TABLET, ORALLY DISINTEGRATING ORAL EVERY 4 HOURS PRN
Status: DISCONTINUED | OUTPATIENT
Start: 2024-05-16 | End: 2024-05-21 | Stop reason: HOSPADM

## 2024-05-16 RX ORDER — ALUMINA, MAGNESIA, AND SIMETHICONE 2400; 2400; 240 MG/30ML; MG/30ML; MG/30ML
15 SUSPENSION ORAL EVERY 6 HOURS PRN
Status: DISCONTINUED | OUTPATIENT
Start: 2024-05-16 | End: 2024-05-21 | Stop reason: HOSPADM

## 2024-05-16 RX ORDER — CALCIUM CARBONATE 500 MG/1
2 TABLET, CHEWABLE ORAL 2 TIMES DAILY PRN
Status: DISCONTINUED | OUTPATIENT
Start: 2024-05-16 | End: 2024-05-21 | Stop reason: HOSPADM

## 2024-05-16 RX ORDER — ESCITALOPRAM OXALATE 10 MG/1
10 TABLET ORAL DAILY
Status: DISCONTINUED | OUTPATIENT
Start: 2024-05-16 | End: 2024-05-21 | Stop reason: HOSPADM

## 2024-05-16 RX ORDER — ONDANSETRON 2 MG/ML
8 INJECTION INTRAMUSCULAR; INTRAVENOUS ONCE
Status: COMPLETED | OUTPATIENT
Start: 2024-05-16 | End: 2024-05-16

## 2024-05-16 RX ORDER — BISACODYL 5 MG/1
5 TABLET, DELAYED RELEASE ORAL DAILY PRN
Status: DISCONTINUED | OUTPATIENT
Start: 2024-05-16 | End: 2024-05-21 | Stop reason: HOSPADM

## 2024-05-16 RX ORDER — SODIUM CHLORIDE 9 MG/ML
40 INJECTION, SOLUTION INTRAVENOUS AS NEEDED
Status: DISCONTINUED | OUTPATIENT
Start: 2024-05-16 | End: 2024-05-21 | Stop reason: HOSPADM

## 2024-05-16 RX ORDER — BISACODYL 10 MG
10 SUPPOSITORY, RECTAL RECTAL DAILY PRN
Status: DISCONTINUED | OUTPATIENT
Start: 2024-05-16 | End: 2024-05-16

## 2024-05-16 RX ORDER — SODIUM CHLORIDE 0.9 % (FLUSH) 0.9 %
10 SYRINGE (ML) INJECTION EVERY 12 HOURS SCHEDULED
Status: DISCONTINUED | OUTPATIENT
Start: 2024-05-16 | End: 2024-05-21 | Stop reason: HOSPADM

## 2024-05-16 RX ORDER — ESCITALOPRAM OXALATE 10 MG/1
10 TABLET ORAL DAILY
COMMUNITY

## 2024-05-16 RX ADMIN — MORPHINE SULFATE 4 MG: 4 INJECTION, SOLUTION INTRAMUSCULAR; INTRAVENOUS at 18:19

## 2024-05-16 RX ADMIN — MORPHINE SULFATE 4 MG: 4 INJECTION, SOLUTION INTRAMUSCULAR; INTRAVENOUS at 12:19

## 2024-05-16 RX ADMIN — ONDANSETRON 4 MG: 2 INJECTION INTRAMUSCULAR; INTRAVENOUS at 18:20

## 2024-05-16 RX ADMIN — SODIUM CHLORIDE AND POTASSIUM CHLORIDE 100 ML/HR: .9; .15 SOLUTION INTRAVENOUS at 20:55

## 2024-05-16 RX ADMIN — MORPHINE SULFATE 4 MG: 4 INJECTION, SOLUTION INTRAMUSCULAR; INTRAVENOUS at 22:41

## 2024-05-16 RX ADMIN — ONDANSETRON 8 MG: 2 INJECTION INTRAMUSCULAR; INTRAVENOUS at 12:19

## 2024-05-16 RX ADMIN — SODIUM CHLORIDE 1000 ML: 9 INJECTION, SOLUTION INTRAVENOUS at 20:53

## 2024-05-16 RX ADMIN — Medication 10 ML: at 20:57

## 2024-05-16 RX ADMIN — ESCITALOPRAM OXALATE 10 MG: 10 TABLET ORAL at 20:54

## 2024-05-16 NOTE — Clinical Note
Level of Care: Med/Surg [1]   Admitting Physician: JUAN NASCIMENTO [[id number]]   Attending Physician: JUAN NASCIMENTO [[id number]]

## 2024-05-16 NOTE — H&P
DATE/TIME OF ADMISSION:  5/16/2024 12:02 PM  Patient Care Team:  Brynn Galicia MD as PCP - General (Family Medicine)    Chief complaint RIGHT HIP PAIN S/P FALL THIS AM  PMH OSTEOPOROSIS  HX OF LEFT TOTAL HIP REPLACEMENT ABOUT 7 YEARS AGO---DR SMITH DID THAT SURGERY    Subjective FELL ABOUT 10AM WHEN OUT IN HER YARD DOING PREPARATION FOR GARDENING    Patient is a 68 y.o. female presents with right hip pain s/p fall in her yard. She was pulling at a raised garden bed when a tember gave way and she fell onto the right hip. She had  to call 911 and she came in by ambulance to the ER. . Onset of symptoms was sudden.      Review of Systems   All other systems reviewed and are negative.         History  Past Medical History:   Diagnosis Date    Asthma     Hyperlipidemia      Past Surgical History:   Procedure Laterality Date    HIP SURGERY Left      No family history on file.  Social History     Tobacco Use    Smoking status: Never     Passive exposure: Never    Smokeless tobacco: Never   Vaping Use    Vaping status: Never Used   Substance Use Topics    Alcohol use: Yes     Comment: occassionaly    Drug use: Never     (Not in a hospital admission)    Allergies:  Ketorolac tromethamine    Objective     Vital Signs  Temp:  [97.4 °F (36.3 °C)] 97.4 °F (36.3 °C)  Heart Rate:  [63-72] 63  Resp:  [17-18] 18  BP: (120-127)/(53-78) 120/53     Physical Exam:      General Appearance:    Alert, cooperative, in no acute distress   Head:    Normocephalic, without obvious abnormality, atraumatic   Eyes:            Lids and lashes normal, conjunctivae and sclerae normal, no   icterus, no pallor, corneas clear, PERRLA   Ears:    Ears appear intact with no abnormalities noted   Throat:   No oral lesions, no thrush, oral mucosa moist   Neck:   No adenopathy, supple, trachea midline, no thyromegaly, no   carotid bruit, no JVD   Lungs:     Clear to auscultation,respirations regular, even and                  unlabored    Heart:     Regular rhythm and normal rate, normal S1 and S2, no            murmur, no gallop, no rub, no click   Chest Wall:    No abnormalities observed   Abdomen:     Normal bowel sounds, no masses, no organomegaly, soft        non-tender, non-distended, no guarding, no rebound                tenderness   Extremities:   Moves all extremities except for  right leg , no edema, no cyanosis, no             redness; no calf tenderness   Pulses:   Pulses palpable and equal bilaterally   Skin:   No bleeding, bruising or rash   Lymph nodes:   No palpable adenopathy   Neurologic:   Cranial nerves 2 - 12 grossly intact, sensation intact, DTR       present and equal bilaterally       I HAVE PERSONALLY EXAMINED THE PATIENT AND HAVE REVIEWED APPROPRIATE LAB AND IMAGING RESULTS.    Imaging Results (Last 24 Hours)       Procedure Component Value Units Date/Time    XR Chest 1 View [[id number]] Collected: 05/16/24 1602     Updated: 05/16/24 1605    Narrative:      XR CHEST 1 VW    Date of Exam: 5/16/2024 3:54 PM EDT    Indication: Preop hip fracture    Comparison: 9/11/2016    Findings:  Metallic surgical hardware projects over the lower thorax, similar since 9/11/2016. The lungs appear adequately aerated without consolidation or mass. No pleural effusion or pneumothorax is identified. The cardiomediastinal silhouette and pulmonary   vasculature appear within normal limits. No acute or suspicious osseous lesion is identified. Bilateral breast implants are noted.      Impression:      Impression:  1.No acute radiographic abnormality is identified.      Electronically Signed: Jm Tovar MD    5/16/2024 4:03 PM EDT    Workstation ID: [id number]    XR Hip With or Without Pelvis 2 - 3 View Right [[id number]] Collected: 05/16/24 1307     Updated: 05/16/24 1310    Narrative:      XR HIP W OR WO PELVIS 2-3 VIEW RIGHT    Date of Exam: 5/16/2024 12:30 PM EDT    Indication: Fall right hip pain    Comparison: 9/20/2016.    Findings:  3 views. There is  a subcapital right hip fracture with impaction. The femoral head remains seated within the acetabulum. The bony pelvic ring is intact. There is a left hip arthroplasty.      Impression:      Impression:  Impacted subcapital right hip fracture.      Electronically Signed: Lidya Arreola MD    5/16/2024 1:08 PM EDT    Workstation ID: [id number]             Lab Results (last 24 hours)       Procedure Component Value Units Date/Time    Comprehensive Metabolic Panel [[id number]]  (Abnormal) Collected: 05/16/24 1359    Specimen: Blood Updated: 05/16/24 1441     Glucose 106 mg/dL      BUN 12 mg/dL      Creatinine 0.79 mg/dL      Sodium 139 mmol/L      Potassium 4.0 mmol/L      Comment: Slight hemolysis detected by analyzer. Result may be falsely elevated.        Chloride 103 mmol/L      CO2 23.0 mmol/L      Calcium 9.1 mg/dL      Total Protein 6.5 g/dL      Albumin 4.3 g/dL      ALT (SGPT) 17 U/L      AST (SGOT) 26 U/L      Comment: Slight hemolysis detected by analyzer. Result may be falsely elevated.        Alkaline Phosphatase 90 U/L      Total Bilirubin 0.3 mg/dL      Globulin 2.2 gm/dL      A/G Ratio 2.0 g/dL      BUN/Creatinine Ratio 15.2     Anion Gap 13.0 mmol/L      eGFR 80.6 mL/min/1.73     Narrative:      GFR Normal >60  Chronic Kidney Disease <60  Kidney Failure <15      Protime-INR [[id number]]  (Normal) Collected: 05/16/24 1359    Specimen: Blood Updated: 05/16/24 1417     Protime 10.3 Seconds      INR 0.94    aPTT [[id number]]  (Abnormal) Collected: 05/16/24 1359    Specimen: Blood Updated: 05/16/24 1417     PTT 21.7 seconds     CBC & Differential [[id number]]  (Abnormal) Collected: 05/16/24 1359    Specimen: Blood Updated: 05/16/24 1404    Narrative:      The following orders were created for panel order CBC & Differential.  Procedure                               Abnormality         Status                     ---------                               -----------         ------                     CBC Auto  Differential[[id number]]        Abnormal            Final result                 Please view results for these tests on the individual orders.    CBC Auto Differential [[id number]]  (Abnormal) Collected: 05/16/24 1359    Specimen: Blood Updated: 05/16/24 1404     WBC 11.01 10*3/mm3      RBC 3.68 10*6/mm3      Hemoglobin 11.0 g/dL      Hematocrit 34.5 %      MCV 93.8 fL      MCH 29.9 pg      MCHC 31.9 g/dL      RDW 11.8 %      RDW-SD 40.9 fl      MPV 9.6 fL      Platelets 199 10*3/mm3      Neutrophil % 81.6 %      Lymphocyte % 11.3 %      Monocyte % 5.5 %      Eosinophil % 0.7 %      Basophil % 0.4 %      Immature Grans % 0.5 %      Neutrophils, Absolute 8.98 10*3/mm3      Lymphocytes, Absolute 1.24 10*3/mm3      Monocytes, Absolute 0.61 10*3/mm3      Eosinophils, Absolute 0.08 10*3/mm3      Basophils, Absolute 0.04 10*3/mm3      Immature Grans, Absolute 0.06 10*3/mm3      nRBC 0.0 /100 WBC              I reviewed the patient's new clinical results.    Assessment & Plan   ACUTE RIGHT HIP FRACTURE---ORTHO CONSULTATION; PAIN MANAGEMENT; PT  PRIOR LEFT TOTAL HIP REPLACEMENT WITH ANTERIOR APPROACH---DID WELL.   OSTEOPOROSIS---ON PROLIA, VIT D, AND CALCIUM  DYSLIPIDEMIA  ALLERGIC ASTHMA---STABLE  VEGAN  MINNIE---CPAP  FULL CODE STATUS  Active Problems:    * No active hospital problems. *          I discussed the patients findings and my recommendations with patient AND  AT THE BASELINE  HE HAS SOME COGNITIVE DECLINE BUT SHE FEELS HE WILL BE SAFE WITH NEIGHBORS LOOKING IN ON HIM. THEY HAVE NO FAMILY IN TOWN    Brynn Galicia MD  05/16/24  19:10 EDT

## 2024-05-16 NOTE — ED PROVIDER NOTES
Subjective   History of Present Illness  Chief complaint fall with right hip pain    History of present illness this is a 68-year-old female who was out working in her yard pulling some boards apart when she slipped and fell backwards onto her right hip this just happened today and she is unable to bear weight and has severe pain in the right hip EMS was called transported to hospital.  She denies any other complaints.  She has had no previous surgeries to the right hip but maybe 7 years ago had a left hip replaced after fracture.  No numbness or tingling or other complaints at this time      Review of Systems   Constitutional:  Negative for chills and fever.   Respiratory:  Negative for chest tightness and shortness of breath.    Cardiovascular:  Negative for chest pain.   Gastrointestinal:  Negative for abdominal pain and vomiting.   Musculoskeletal:  Negative for back pain and neck pain.   Skin:  Negative for wound.   Neurological:  Negative for headaches.   Psychiatric/Behavioral:  Negative for confusion.        Past Medical History:   Diagnosis Date    Asthma     Hyperlipidemia        Allergies   Allergen Reactions    Ketorolac Tromethamine Anaphylaxis       Past Surgical History:   Procedure Laterality Date    HIP SURGERY Left        No family history on file.    Social History     Socioeconomic History    Marital status:    Tobacco Use    Smoking status: Never     Passive exposure: Never    Smokeless tobacco: Never   Vaping Use    Vaping status: Never Used   Substance and Sexual Activity    Alcohol use: Yes     Comment: occassionaly    Drug use: Never    Sexual activity: Defer     Prior to Admission medications    Medication Sig Start Date End Date Taking? Authorizing Provider   escitalopram (LEXAPRO) 10 MG tablet Take 1 tablet by mouth Daily.    Provider, MD Tori   cephalexin (KEFLEX) 500 MG capsule Take 1 capsule by mouth 3 (Three) Times a Day. 1/26/24 5/16/24  Michelle Jean, APRN    escitalopram (LEXAPRO) 10 MG tablet  1/14/24 5/16/24  Provider, MD Tori   rosuvastatin (CRESTOR) 10 MG tablet Take 1 tablet by mouth Daily.  5/16/24  Provider, MD Tori          Objective   Physical Exam  Constitutional this is a 68-year-old awake alert nontoxic-appearing triage vital signs reviewed.  HEENT no signs of trauma extraocular muscles are intact pupils equal round reactive no raccoon or Gunter sign.  Back no cervical thoracic or lumbar spine tenderness noted trachea midline lungs clear no retraction heart regular without murmur abdomen soft nontender good bowel sounds no peritoneal findings or pulsatile masses extremities pulses equal in lower extremities no edema cords or Homans' sign examination of the hip she has a lot of pain to the right hip with some shortening to it toes under including big toe dorsiflex plantar without difficulty.  Neurologic awake alert orientated x 4 with [place] Coma Scale 15  Procedures           ED Course      Results for orders placed or performed during the hospital encounter of 05/16/24   Comprehensive Metabolic Panel    Specimen: Blood   Result Value Ref Range    Glucose 106 (H) 65 - 99 mg/dL    BUN 12 8 - 23 mg/dL    Creatinine 0.79 0.57 - 1.00 mg/dL    Sodium 139 136 - 145 mmol/L    Potassium 4.0 3.5 - 5.2 mmol/L    Chloride 103 98 - 107 mmol/L    CO2 23.0 22.0 - 29.0 mmol/L    Calcium 9.1 8.6 - 10.5 mg/dL    Total Protein 6.5 6.0 - 8.5 g/dL    Albumin 4.3 3.5 - 5.2 g/dL    ALT (SGPT) 17 1 - 33 U/L    AST (SGOT) 26 1 - 32 U/L    Alkaline Phosphatase 90 39 - 117 U/L    Total Bilirubin 0.3 0.0 - 1.2 mg/dL    Globulin 2.2 gm/dL    A/G Ratio 2.0 g/dL    BUN/Creatinine Ratio 15.2 7.0 - 25.0    Anion Gap 13.0 5.0 - 15.0 mmol/L    eGFR 80.6 >60.0 mL/min/1.73   Protime-INR    Specimen: Blood   Result Value Ref Range    Protime 10.3 9.6 - 11.7 Seconds    INR 0.94 0.93 - 1.10   aPTT    Specimen: Blood   Result Value Ref Range    PTT 21.7 (L) 61.0 - 76.5 seconds    CBC Auto Differential    Specimen: Blood   Result Value Ref Range    WBC 11.01 (H) 3.40 - 10.80 10*3/mm3    RBC 3.68 (L) 3.77 - 5.28 10*6/mm3    Hemoglobin 11.0 (L) 12.0 - 15.9 g/dL    Hematocrit 34.5 34.0 - 46.6 %    MCV 93.8 79.0 - 97.0 fL    MCH 29.9 26.6 - 33.0 pg    MCHC 31.9 31.5 - 35.7 g/dL    RDW 11.8 (L) 12.3 - 15.4 %    RDW-SD 40.9 37.0 - 54.0 fl    MPV 9.6 6.0 - 12.0 fL    Platelets 199 140 - 450 10*3/mm3    Neutrophil % 81.6 (H) 42.7 - 76.0 %    Lymphocyte % 11.3 (L) 19.6 - 45.3 %    Monocyte % 5.5 5.0 - 12.0 %    Eosinophil % 0.7 0.3 - 6.2 %    Basophil % 0.4 0.0 - 1.5 %    Immature Grans % 0.5 0.0 - 0.5 %    Neutrophils, Absolute 8.98 (H) 1.70 - 7.00 10*3/mm3    Lymphocytes, Absolute 1.24 0.70 - 3.10 10*3/mm3    Monocytes, Absolute 0.61 0.10 - 0.90 10*3/mm3    Eosinophils, Absolute 0.08 0.00 - 0.40 10*3/mm3    Basophils, Absolute 0.04 0.00 - 0.20 10*3/mm3    Immature Grans, Absolute 0.06 (H) 0.00 - 0.05 10*3/mm3    nRBC 0.0 0.0 - 0.2 /100 WBC   ECG 12 Lead Pre-Op / Pre-Procedure   Result Value Ref Range    QT Interval 402 ms    QTC Interval 407 ms   Type & Screen    Specimen: Blood   Result Value Ref Range    ABO Type O     RH type Positive     Antibody Screen Negative     T&S Expiration Date 5/19/2024 11:59:59 PM      XR Chest 1 View    Result Date: 5/16/2024  Impression: 1.No acute radiographic abnormality is identified. Electronically Signed: Jm Tovar MD  5/16/2024 4:03 PM EDT  Workstation ID: [id number]    XR Hip With or Without Pelvis 2 - 3 View Right    Result Date: 5/16/2024  Impression: Impacted subcapital right hip fracture. Electronically Signed: Lidya Arreola MD  5/16/2024 1:08 PM EDT  Workstation ID: [id number]   Medications   sodium chloride 0.9 % flush 10 mL (has no administration in time range)   morphine injection 4 mg (4 mg Intravenous Given 5/16/24 1219)   ondansetron (ZOFRAN) injection 8 mg (8 mg Intravenous Given 5/16/24 1219)       EKG my interpretation normal sinus rhythm  rate is 61 normal axis no [place] QTc of 407 normal EKG unchanged from 9/7/2016                                       Medical Decision Making  Medical decision making IV established patient was given morphine 4 IV and had x-rays obtained my independent review shows a subcapital right-sided hip fracture.  Patient was made aware of those findings.  She is unsure of the last orthopedist she had but it was 7 years ago.  She does not currently have 1.  Preop EKG obtained my interpretation normal sinus rhythm rate 60 normal axis hypertrophy QTc of 4 7 was normal and unchanged from 9/7/2016.  Labs obtained my independent review comprehensive metabolic profile unremarkable coags unremarkable CBC unremarkable other than hemoglobin of 11.  The patient has no specific orthopedist at this time turns out the previous 1 is no longer in practice here.  I talked to her family doctor for sure we discussed the case she requested to try to consult Dr. dhillon.  I have tried to call but have not received any phone call back yet I did put a order in the computer for a consultation from him.  The patient was made aware of the findings she is feeling better on repeat exam I do not see evidence of a back or spine injury or head injury.  No evidence of vascular compromise distally patient with signs stable otherwise unremarkable ER course she will be admitted for further care.    Problems Addressed:  Closed subcapital fracture of right femur, initial encounter: complicated acute illness or injury  Fall, initial encounter: complicated acute illness or injury    Amount and/or Complexity of Data Reviewed  Labs: ordered. Decision-making details documented in ED Course.  Radiology: ordered and independent interpretation performed. Decision-making details documented in ED Course.  ECG/medicine tests: ordered and independent interpretation performed. Decision-making details documented in ED Course.    Risk  Prescription drug management.  Decision  regarding hospitalization.        Final diagnoses:   Fall, initial encounter   Closed subcapital fracture of right femur, initial encounter       ED Disposition  ED Disposition       ED Disposition   Decision to Admit    Condition   --    Comment   Level of Care: Med/Surg [1]   Admitting Physician: JUAN NASCIMENTO [[id number]]   Attending Physician: JUAN NASCIMENTO [[id number]]                 No follow-up provider specified.       Medication List        ASK your doctor about these medications      escitalopram 10 MG tablet  Commonly known as: LEXAPRO  Ask about: Which instructions should I use?                 Laith Diehl MD  05/16/24 4171

## 2024-05-16 NOTE — ED NOTES
Nursing report ED to floor  Irene Ospina  68 y.o.  female    HPI:   Chief Complaint   Patient presents with    Fall       Admitting doctor:   Brynn Galicia MD    Admitting diagnosis:   The primary encounter diagnosis was Fall, initial encounter. A diagnosis of Closed subcapital fracture of right femur, initial encounter was also pertinent to this visit.    Code status:   Current Code Status       Date Active Code Status Order ID Comments User Context       Not on file            Allergies:   Ketorolac tromethamine    Isolation:  No active isolations     Fall Risk:  Fall Risk Assessment was completed, and patient is at moderate risk for falls.   Predictive Model Details         10 (Low) Factor Value    Calculated 5/16/2024 17:54 Age 68    Risk of Fall Model Active Peripheral IV Present     Imaging order in this encounter Present     Respiratory Rate 18     Diastolic BP 53     Magnesium not on file     Real Scale not on file     Number of Distinct Medication Classes administered 2     Calcium 9.1 mg/dL     Number of administrations of Analgesic Narcotics 1     Days after Admission 0.245     Chloride 103 mmol/L     Albumin 4.3 g/dL     ALT 17 U/L     Potassium 4 mmol/L     Total Bilirubin 0.3 mg/dL     Creatinine 0.79 mg/dL         Weight:       05/16/24  1158   Weight: 69.4 kg (153 lb)       Intake and Output    Intake/Output Summary (Last 24 hours) at 5/16/2024 1754  Last data filed at 5/16/2024 1159  Gross per 24 hour   Intake 250 ml   Output --   Net 250 ml       Diet:   Dietary Orders (From admission, onward)       Start     Ordered    05/16/24 1654  Diet: Vegetarian; Vegan (No animal products); Fluid Consistency: Thin (IDDSI 0)  Diet Effective Now        References:    Diet Order Crosswalk   Question Answer Comment   Diets: Vegetarian    Vegetarian: Vegan (No animal products)    Fluid Consistency: Thin (IDDSI 0)        05/16/24 1656                     Most recent vitals:   Vitals:    05/16/24 1158 05/16/24  "1216 05/16/24 1409   BP: 123/62 127/78 120/53   BP Location:  Right arm Right arm   Patient Position:  Lying Lying   Pulse: 72 70 63   Resp: 17 18 18   Temp: 97.4 °F (36.3 °C)     TempSrc: Oral     SpO2: 97% 98% 96%   Weight: 69.4 kg (153 lb)     Height: 160 cm (63\")         Active LDAs/IV Access:   Lines, Drains & Airways       Active LDAs       Name Placement date Placement time Site Days    Peripheral IV 05/16/24 1200 Anterior;Right Forearm 05/16/24  1200  Forearm  less than 1                    Skin Condition:   Skin Assessments (last day)       None             Labs (abnormal labs have a star):   Labs Reviewed   COMPREHENSIVE METABOLIC PANEL - Abnormal; Notable for the following components:       Result Value    Glucose 106 (*)     All other components within normal limits    Narrative:     GFR Normal >60  Chronic Kidney Disease <60  Kidney Failure <15     APTT - Abnormal; Notable for the following components:    PTT 21.7 (*)     All other components within normal limits   CBC WITH AUTO DIFFERENTIAL - Abnormal; Notable for the following components:    WBC 11.01 (*)     RBC 3.68 (*)     Hemoglobin 11.0 (*)     RDW 11.8 (*)     Neutrophil % 81.6 (*)     Lymphocyte % 11.3 (*)     Neutrophils, Absolute 8.98 (*)     Immature Grans, Absolute 0.06 (*)     All other components within normal limits   PROTIME-INR - Normal   TYPE AND SCREEN   CBC AND DIFFERENTIAL    Narrative:     The following orders were created for panel order CBC & Differential.  Procedure                               Abnormality         Status                     ---------                               -----------         ------                     CBC Auto Differential[[id number]]        Abnormal            Final result                 Please view results for these tests on the individual orders.       LOC: Person, Place, Time, and Situation    Telemetry:  Med/Surg    Cardiac Monitoring Ordered: no    EKG:   ECG 12 Lead Pre-Op / Pre-Procedure "   Preliminary Result   HEART RATE= 61  bpm   RR Interval= 976  ms   WV Interval= 193  ms   P Horizontal Axis= -7  deg   P Front Axis= 40  deg   QRSD Interval= 75  ms   QT Interval= 402  ms   QTcB= 407  ms   QRS Axis= 59  deg   T Wave Axis= 29  deg   - OTHERWISE NORMAL ECG -   Sinus rhythm   Low voltage, precordial leads   Electronically Signed By:    Date and Time of Study: 2024-05-16 14:19:57          Medications Given in the ED:   Medications   sodium chloride 0.9 % flush 10 mL (has no administration in time range)   ondansetron (ZOFRAN) injection 4 mg (has no administration in time range)   morphine injection 4 mg (has no administration in time range)   morphine injection 4 mg (4 mg Intravenous Given 5/16/24 1219)   ondansetron (ZOFRAN) injection 8 mg (8 mg Intravenous Given 5/16/24 1219)       Imaging results:  XR Chest 1 View    Result Date: 5/16/2024  Impression: 1.No acute radiographic abnormality is identified. Electronically Signed: Jm Tovar MD  5/16/2024 4:03 PM EDT  Workstation ID: [id number]    XR Hip With or Without Pelvis 2 - 3 View Right    Result Date: 5/16/2024  Impression: Impacted subcapital right hip fracture. Electronically Signed: Lidya Arreola MD  5/16/2024 1:08 PM EDT  Workstation ID: [id number]     Social issues:   Social History     Socioeconomic History    Marital status:    Tobacco Use    Smoking status: Never     Passive exposure: Never    Smokeless tobacco: Never   Vaping Use    Vaping status: Never Used   Substance and Sexual Activity    Alcohol use: Yes     Comment: occassionaly    Drug use: Never    Sexual activity: Defer       NIH Stroke Scale:  Interval: (not recorded)  1a. Level of Consciousness: (not recorded)  1b. LOC Questions: (not recorded)  1c. LOC Commands: (not recorded)  2. Best Gaze: (not recorded)  3. Visual: (not recorded)  4. Facial Palsy: (not recorded)  5a. Motor Arm, Left: (not recorded)  5b. Motor Arm, Right: (not recorded)  6a. Motor Leg, Left: (not  recorded)  6b. Motor Leg, Right: (not recorded)  7. Limb Ataxia: (not recorded)  8. Sensory: (not recorded)  9. Best Language: (not recorded)  10. Dysarthria: (not recorded)  11. Extinction and Inattention (formerly Neglect): (not recorded)    Total (NIH Stroke Scale): (not recorded)     Additional notable assessment information:     Nursing report ED to floor:  PANCHITO Li RN   05/16/24 17:54 EDT

## 2024-05-17 ENCOUNTER — ANESTHESIA (OUTPATIENT)
Dept: PERIOP | Facility: HOSPITAL | Age: 69
End: 2024-05-17
Payer: MEDICARE

## 2024-05-17 ENCOUNTER — ANESTHESIA EVENT (OUTPATIENT)
Dept: PERIOP | Facility: HOSPITAL | Age: 69
End: 2024-05-17
Payer: MEDICARE

## 2024-05-17 ENCOUNTER — APPOINTMENT (OUTPATIENT)
Dept: GENERAL RADIOLOGY | Facility: HOSPITAL | Age: 69
End: 2024-05-17
Payer: MEDICARE

## 2024-05-17 LAB
ALBUMIN SERPL-MCNC: 3.8 G/DL (ref 3.5–5.2)
ALBUMIN/GLOB SERPL: 1.8 G/DL
ALP SERPL-CCNC: 74 U/L (ref 39–117)
ALT SERPL W P-5'-P-CCNC: 13 U/L (ref 1–33)
ANION GAP SERPL CALCULATED.3IONS-SCNC: 11 MMOL/L (ref 5–15)
APTT PPP: 26.1 SECONDS (ref 24–31)
AST SERPL-CCNC: 22 U/L (ref 1–32)
BASOPHILS # BLD AUTO: 0.04 10*3/MM3 (ref 0–0.2)
BASOPHILS NFR BLD AUTO: 0.4 % (ref 0–1.5)
BILIRUB SERPL-MCNC: 0.5 MG/DL (ref 0–1.2)
BUN SERPL-MCNC: 15 MG/DL (ref 8–23)
BUN/CREAT SERPL: 17 (ref 7–25)
CALCIUM SPEC-SCNC: 8.5 MG/DL (ref 8.6–10.5)
CHLORIDE SERPL-SCNC: 103 MMOL/L (ref 98–107)
CO2 SERPL-SCNC: 22 MMOL/L (ref 22–29)
CREAT SERPL-MCNC: 0.88 MG/DL (ref 0.57–1)
DEPRECATED RDW RBC AUTO: 39.6 FL (ref 37–54)
EGFRCR SERPLBLD CKD-EPI 2021: 70.8 ML/MIN/1.73
EOSINOPHIL # BLD AUTO: 0.17 10*3/MM3 (ref 0–0.4)
EOSINOPHIL NFR BLD AUTO: 1.5 % (ref 0.3–6.2)
ERYTHROCYTE [DISTWIDTH] IN BLOOD BY AUTOMATED COUNT: 11.9 % (ref 12.3–15.4)
GLOBULIN UR ELPH-MCNC: 2.1 GM/DL
GLUCOSE SERPL-MCNC: 125 MG/DL (ref 65–99)
HCT VFR BLD AUTO: 35 % (ref 34–46.6)
HGB BLD-MCNC: 11.5 G/DL (ref 12–15.9)
IMM GRANULOCYTES # BLD AUTO: 0.04 10*3/MM3 (ref 0–0.05)
IMM GRANULOCYTES NFR BLD AUTO: 0.4 % (ref 0–0.5)
INR PPP: 0.98 (ref 0.93–1.1)
LYMPHOCYTES # BLD AUTO: 1.37 10*3/MM3 (ref 0.7–3.1)
LYMPHOCYTES NFR BLD AUTO: 12.4 % (ref 19.6–45.3)
MCH RBC QN AUTO: 30 PG (ref 26.6–33)
MCHC RBC AUTO-ENTMCNC: 32.9 G/DL (ref 31.5–35.7)
MCV RBC AUTO: 91.4 FL (ref 79–97)
MONOCYTES # BLD AUTO: 0.79 10*3/MM3 (ref 0.1–0.9)
MONOCYTES NFR BLD AUTO: 7.1 % (ref 5–12)
NEUTROPHILS NFR BLD AUTO: 78.2 % (ref 42.7–76)
NEUTROPHILS NFR BLD AUTO: 8.68 10*3/MM3 (ref 1.7–7)
NRBC BLD AUTO-RTO: 0 /100 WBC (ref 0–0.2)
PLATELET # BLD AUTO: 222 10*3/MM3 (ref 140–450)
PMV BLD AUTO: 9.3 FL (ref 6–12)
POTASSIUM SERPL-SCNC: 4.6 MMOL/L (ref 3.5–5.2)
PROT SERPL-MCNC: 5.9 G/DL (ref 6–8.5)
PROTHROMBIN TIME: 10.7 SECONDS (ref 9.6–11.7)
QT INTERVAL: 390 MS
QT INTERVAL: 402 MS
QTC INTERVAL: 407 MS
QTC INTERVAL: 423 MS
RBC # BLD AUTO: 3.83 10*6/MM3 (ref 3.77–5.28)
SODIUM SERPL-SCNC: 136 MMOL/L (ref 136–145)
WBC NRBC COR # BLD AUTO: 11.09 10*3/MM3 (ref 3.4–10.8)

## 2024-05-17 PROCEDURE — 25010000002 ONDANSETRON PER 1 MG: Performed by: FAMILY MEDICINE

## 2024-05-17 PROCEDURE — 93010 ELECTROCARDIOGRAM REPORT: CPT | Performed by: INTERNAL MEDICINE

## 2024-05-17 PROCEDURE — 25010000002 SODIUM CHLORIDE 0.9 % WITH KCL 20 MEQ 20-0.9 MEQ/L-% SOLUTION: Performed by: FAMILY MEDICINE

## 2024-05-17 PROCEDURE — 85025 COMPLETE CBC W/AUTO DIFF WBC: CPT | Performed by: FAMILY MEDICINE

## 2024-05-17 PROCEDURE — 85610 PROTHROMBIN TIME: CPT | Performed by: FAMILY MEDICINE

## 2024-05-17 PROCEDURE — 80053 COMPREHEN METABOLIC PANEL: CPT | Performed by: FAMILY MEDICINE

## 2024-05-17 PROCEDURE — 72170 X-RAY EXAM OF PELVIS: CPT

## 2024-05-17 PROCEDURE — 25010000002 MORPHINE PER 10 MG: Performed by: FAMILY MEDICINE

## 2024-05-17 PROCEDURE — 85730 THROMBOPLASTIN TIME PARTIAL: CPT | Performed by: FAMILY MEDICINE

## 2024-05-17 PROCEDURE — 93005 ELECTROCARDIOGRAM TRACING: CPT | Performed by: FAMILY MEDICINE

## 2024-05-17 RX ADMIN — MORPHINE SULFATE 4 MG: 4 INJECTION, SOLUTION INTRAMUSCULAR; INTRAVENOUS at 18:32

## 2024-05-17 RX ADMIN — Medication 10 ML: at 08:56

## 2024-05-17 RX ADMIN — SODIUM CHLORIDE AND POTASSIUM CHLORIDE 100 ML/HR: .9; .15 SOLUTION INTRAVENOUS at 19:57

## 2024-05-17 RX ADMIN — ONDANSETRON 4 MG: 2 INJECTION INTRAMUSCULAR; INTRAVENOUS at 14:34

## 2024-05-17 RX ADMIN — SODIUM CHLORIDE AND POTASSIUM CHLORIDE 100 ML/HR: .9; .15 SOLUTION INTRAVENOUS at 08:56

## 2024-05-17 RX ADMIN — MORPHINE SULFATE 4 MG: 4 INJECTION, SOLUTION INTRAMUSCULAR; INTRAVENOUS at 15:19

## 2024-05-17 RX ADMIN — Medication 10 ML: at 20:51

## 2024-05-17 RX ADMIN — MORPHINE SULFATE 4 MG: 4 INJECTION, SOLUTION INTRAMUSCULAR; INTRAVENOUS at 02:53

## 2024-05-17 RX ADMIN — ESCITALOPRAM OXALATE 10 MG: 10 TABLET ORAL at 08:40

## 2024-05-17 RX ADMIN — MORPHINE SULFATE 4 MG: 4 INJECTION, SOLUTION INTRAMUSCULAR; INTRAVENOUS at 10:45

## 2024-05-17 RX ADMIN — HYDROCODONE BITARTRATE AND ACETAMINOPHEN 1 TABLET: 7.5; 325 TABLET ORAL at 20:51

## 2024-05-17 NOTE — PLAN OF CARE
Goal Outcome Evaluation:      Pain controlled with IV pain meds. Weight shifting encouraged, pt asks for assistance when needs. Call light within reach, plan of care on going.

## 2024-05-17 NOTE — CASE MANAGEMENT/SOCIAL WORK
Discharge Planning Assessment   Minh     Patient Name: Irene Ospina  MRN: [id number]  Today's Date: 5/17/2024    Admit Date: 5/16/2024    Plan: From home with .  Surgery today 05/17/24,  will need PT OT eval.  Family can transport at discharge.   Discharge Needs Assessment       Row Name 05/17/24 1518       Living Environment    Provides Primary Care For no one       Discharge Needs Assessment    Outpatient/Agency/Support Group Needs homecare agency    Discharge Facility/Level of Care Needs home with home health    Provided Post Acute Provider List? Yes    Post Acute Provider List Home Health    Delivered To Patient    Method of Delivery In person    Patient's Choice of Community Agency(s) [facility], [facility]      Row Name 05/17/24 1445       Living Environment    People in Home spouse    Name(s) of People in Home Jamal    Current Living Arrangements home    Potentially Unsafe Housing Conditions none    In the past 12 months has the electric, gas, oil, or water company threatened to shut off services in your home? No    Primary Care Provided by self    Provides Primary Care For no one    Family Caregiver if Needed spouse    Family Caregiver Names Jamal    Quality of Family Relationships helpful;involved;supportive    Able to Return to Prior Arrangements yes       Resource/Environmental Concerns    Resource/Environmental Concerns none    Transportation Concerns none       Transportation Needs    In the past 12 months, has lack of transportation kept you from medical appointments or from getting medications? no    In the past 12 months, has lack of transportation kept you from meetings, work, or from getting things needed for daily living? No       Food Insecurity    Within the past 12 months, you worried that your food would run out before you got the money to buy more. Never true    Within the past 12 months, the food you bought just didn't last and you didn't have money to get more. Never true        Transition Planning    Patient/Family Anticipates Transition to home with family;home with help/services    Patient/Family Anticipated Services at Transition home health care    Transportation Anticipated car, drives self;family or friend will provide       Discharge Needs Assessment    Readmission Within the Last 30 Days no previous admission in last 30 days    Equipment Currently Used at Home cpap;other (see comments)  CPAP  supplied by [facility], has rolling walker, cane and crutches not using at this time.    Concerns to be Addressed care coordination/care conferences;discharge planning    Anticipated Changes Related to Illness none    Equipment Needed After Discharge none                   Discharge Plan       Row Name 05/17/24 1519       Plan    Plan From home with .  Surgery today 05/17/24,  will need PT OT eval.  Family can transport at discharge.    Patient/Family in Agreement with Plan yes    Plan Comments Patient lives at home with   Patient drives, family  will transport at discharge. Patient performs ADL. PCP and pharmacy confirmed. Denies financial assistance needs for medication and/or food. Hip surgery today 05/17/24.  Will need PT and OT eval.  Patient would like [facility] or [facility] health if needed.  Has rolling walker at home.                  Continued Care and Services - Admitted Since 5/16/2024    No active coordination exists for this encounter.       Expected Discharge Date and Time       Expected Discharge Date Expected Discharge Time    May 18, 2024            Demographic Summary       Row Name 05/17/24 1448       General Information    Admission Type inpatient    Arrived From emergency department    Required Notices Provided Important Message from Medicare    Referral Source admission list    Reason for Consult discharge planning    Preferred Language English       Contact Information    Permission Granted to Share Info With                    Functional Status        Row Name 05/17/24 1445       Functional Status    Usual Activity Tolerance good    Current Activity Tolerance good       Functional Status, IADL    Medications independent    Meal Preparation independent    Housekeeping independent    Laundry independent    Shopping independent       Mental Status    General Appearance WDL WDL       Mental Status Summary    Recent Changes in Mental Status/Cognitive Functioning no changes                     Patient Forms       Row Name 05/17/24 1355       Patient Forms    Important Message from Medicare (IMM) Delivered  IMM del 5.16.24 EC reg                      Niesha Maria RN

## 2024-05-17 NOTE — PROGRESS NOTES
"DATE/TIME OF ADMISSION:  5/16/2024 12:02 PM     LOS: 1 day   Patient Care Team:  Brynn Galicia MD as PCP - General (Family Medicine)  Consults       Date and Time Order Name Status Description    5/16/2024  2:21 PM Ortho (on-call MD unless specified)      5/16/2024  1:20 PM Family Medicine Consult              Subjective NPO AND AWAITING ORTHO CONSULTATION FOR RIGHT HIP FRACTURE    Interval History: S/P FALL YESTERDAY; SURGERY AT 5PM PLANNED TODAY  SOME KIND OF MIXUP IN SCHEDULING WITH SURGERY AND I REACHED OUT  TO LANDEN TO AGAIN CALL DR REGALADO AS MY PATIENT WAS NOT ON THE  SCHEDULE. NURSE HAD COMMUNICATED WITH DR STEARNS EARLIER TODAY. NOW ON THE  SCHEDULE AND THE PERSON THAT LANDEN TOLD ME WAS ON THE  SCHEDULE ACTUALLY HAD SURGERY YESTERDAY. UPDATED MY PATIENT AT THE BEDSIDE REGARDING SURGERY TIME TODAY    Patient Complaints: RIGHT HIP PAIN; EXPLAINED TO HER THAT SHE MUST ASK FOR PAIN MEDS WHEN NEEDED  SHE SAID THAT THEY CERTAINLY HELP WHEN TAKEN      History taken from: patient    Review of Systems        Objective     Vital Signs  /70 (BP Location: Left arm, Patient Position: Lying)   Pulse 67   Temp 98.8 °F (37.1 °C) (Oral)   Resp 16   Ht 160 cm (63\")   Wt 74.4 kg (164 lb 0.4 oz)   SpO2 96%   BMI 29.06 kg/m²     Physical Exam:       General Appearance:    Alert, cooperative, in no acute distress   Head:    Normocephalic, without obvious abnormality, atraumatic   Eyes:            Lids and lashes normal, conjunctivae and sclerae normal, no   icterus, no pallor, corneas clear, PERRLA   Ears:    Ears appear intact with no abnormalities noted   Throat:   No oral lesions, no thrush, oral mucosa moist   Neck:   No adenopathy, supple, trachea midline, no thyromegaly, no   carotid bruit, no JVD   Lungs:     Clear to auscultation,respirations regular, even and                  unlabored    Heart:    Regular rhythm and normal rate, normal S1 and S2, no            murmur, no gallop, no rub, no click   Chest " Wall:    No abnormalities observed; SHE HAS PECTUS EXCAVATUM;BREAST IMPLANTS   Abdomen:     Normal bowel sounds, no masses, no organomegaly, soft        non-tender, non-distended, no guarding, no rebound                tenderness   Extremities:   Moves all extremities EXCEPT FOR THE RIGHT LEG, no edema, no cyanosis, no             redness;NO CALF TENDERNESS   Pulses:   Pulses palpable and equal bilaterally   Skin:   No bleeding, bruising or rash   Lymph nodes:   No palpable adenopathy   Neurologic:   Grossly normal, alert and O x 3  USED CPAP AND IT IS AT HER BEDSIDE        I HAVE PERSONALLY EXAMINED AND REVIEWED THE PATIENT'S RECORD     Lab Results (last 48 hours)       Procedure Component Value Units Date/Time    Comprehensive Metabolic Panel [[id number]]  (Abnormal) Collected: 05/17/24 0347    Specimen: Blood from Arm, Left Updated: 05/17/24 0418     Glucose 125 mg/dL      BUN 15 mg/dL      Creatinine 0.88 mg/dL      Sodium 136 mmol/L      Potassium 4.6 mmol/L      Chloride 103 mmol/L      CO2 22.0 mmol/L      Calcium 8.5 mg/dL      Total Protein 5.9 g/dL      Albumin 3.8 g/dL      ALT (SGPT) 13 U/L      AST (SGOT) 22 U/L      Alkaline Phosphatase 74 U/L      Total Bilirubin 0.5 mg/dL      Globulin 2.1 gm/dL      A/G Ratio 1.8 g/dL      BUN/Creatinine Ratio 17.0     Anion Gap 11.0 mmol/L      eGFR 70.8 mL/min/1.73     Narrative:      GFR Normal >60  Chronic Kidney Disease <60  Kidney Failure <15      Protime-INR [[id number]]  (Normal) Collected: 05/17/24 0347    Specimen: Blood from Arm, Left Updated: 05/17/24 0414     Protime 10.7 Seconds      INR 0.98    aPTT [[id number]]  (Normal) Collected: 05/17/24 0347    Specimen: Blood from Arm, Left Updated: 05/17/24 0414     PTT 26.1 seconds     CBC Auto Differential [[id number]]  (Abnormal) Collected: 05/17/24 0347    Specimen: Blood from Arm, Left Updated: 05/17/24 0357     WBC 11.09 10*3/mm3      RBC 3.83 10*6/mm3      Hemoglobin 11.5 g/dL      Hematocrit 35.0 %      MCV  91.4 fL      MCH 30.0 pg      MCHC 32.9 g/dL      RDW 11.9 %      RDW-SD 39.6 fl      MPV 9.3 fL      Platelets 222 10*3/mm3      Neutrophil % 78.2 %      Lymphocyte % 12.4 %      Monocyte % 7.1 %      Eosinophil % 1.5 %      Basophil % 0.4 %      Immature Grans % 0.4 %      Neutrophils, Absolute 8.68 10*3/mm3      Lymphocytes, Absolute 1.37 10*3/mm3      Monocytes, Absolute 0.79 10*3/mm3      Eosinophils, Absolute 0.17 10*3/mm3      Basophils, Absolute 0.04 10*3/mm3      Immature Grans, Absolute 0.04 10*3/mm3      nRBC 0.0 /100 WBC     Comprehensive Metabolic Panel [[id number]]  (Abnormal) Collected: 05/16/24 1359    Specimen: Blood Updated: 05/16/24 1441     Glucose 106 mg/dL      BUN 12 mg/dL      Creatinine 0.79 mg/dL      Sodium 139 mmol/L      Potassium 4.0 mmol/L      Comment: Slight hemolysis detected by analyzer. Result may be falsely elevated.        Chloride 103 mmol/L      CO2 23.0 mmol/L      Calcium 9.1 mg/dL      Total Protein 6.5 g/dL      Albumin 4.3 g/dL      ALT (SGPT) 17 U/L      AST (SGOT) 26 U/L      Comment: Slight hemolysis detected by analyzer. Result may be falsely elevated.        Alkaline Phosphatase 90 U/L      Total Bilirubin 0.3 mg/dL      Globulin 2.2 gm/dL      A/G Ratio 2.0 g/dL      BUN/Creatinine Ratio 15.2     Anion Gap 13.0 mmol/L      eGFR 80.6 mL/min/1.73     Narrative:      GFR Normal >60  Chronic Kidney Disease <60  Kidney Failure <15      Protime-INR [[id number]]  (Normal) Collected: 05/16/24 1359    Specimen: Blood Updated: 05/16/24 1417     Protime 10.3 Seconds      INR 0.94    aPTT [[id number]]  (Abnormal) Collected: 05/16/24 1359    Specimen: Blood Updated: 05/16/24 1417     PTT 21.7 seconds     CBC & Differential [[id number]]  (Abnormal) Collected: 05/16/24 1359    Specimen: Blood Updated: 05/16/24 1404    Narrative:      The following orders were created for panel order CBC & Differential.  Procedure                               Abnormality         Status                      ---------                               -----------         ------                     CBC Auto Differential[[id number]]        Abnormal            Final result                 Please view results for these tests on the individual orders.    CBC Auto Differential [[id number]]  (Abnormal) Collected: 05/16/24 1359    Specimen: Blood Updated: 05/16/24 1404     WBC 11.01 10*3/mm3      RBC 3.68 10*6/mm3      Hemoglobin 11.0 g/dL      Hematocrit 34.5 %      MCV 93.8 fL      MCH 29.9 pg      MCHC 31.9 g/dL      RDW 11.8 %      RDW-SD 40.9 fl      MPV 9.6 fL      Platelets 199 10*3/mm3      Neutrophil % 81.6 %      Lymphocyte % 11.3 %      Monocyte % 5.5 %      Eosinophil % 0.7 %      Basophil % 0.4 %      Immature Grans % 0.5 %      Neutrophils, Absolute 8.98 10*3/mm3      Lymphocytes, Absolute 1.24 10*3/mm3      Monocytes, Absolute 0.61 10*3/mm3      Eosinophils, Absolute 0.08 10*3/mm3      Basophils, Absolute 0.04 10*3/mm3      Immature Grans, Absolute 0.06 10*3/mm3      nRBC 0.0 /100 WBC              Imaging Results (Last 48 Hours)       Procedure Component Value Units Date/Time    XR Chest 1 View [[id number]] Collected: 05/16/24 1602     Updated: 05/16/24 1605    Narrative:      XR CHEST 1 VW    Date of Exam: 5/16/2024 3:54 PM EDT    Indication: Preop hip fracture    Comparison: 9/11/2016    Findings:  Metallic surgical hardware projects over the lower thorax, similar since 9/11/2016. The lungs appear adequately aerated without consolidation or mass. No pleural effusion or pneumothorax is identified. The cardiomediastinal silhouette and pulmonary   vasculature appear within normal limits. No acute or suspicious osseous lesion is identified. Bilateral breast implants are noted.      Impression:      Impression:  1.No acute radiographic abnormality is identified.      Electronically Signed: Jm Tovar MD    5/16/2024 4:03 PM EDT    Workstation ID: [id number]    XR Hip With or Without Pelvis 2 - 3 View Right  [[id number]] Collected: 05/16/24 1307     Updated: 05/16/24 1310    Narrative:      XR HIP W OR WO PELVIS 2-3 VIEW RIGHT    Date of Exam: 5/16/2024 12:30 PM EDT    Indication: Fall right hip pain    Comparison: 9/20/2016.    Findings:  3 views. There is a subcapital right hip fracture with impaction. The femoral head remains seated within the acetabulum. The bony pelvic ring is intact. There is a left hip arthroplasty.      Impression:      Impression:  Impacted subcapital right hip fracture.      Electronically Signed: Lidya Arreola MD    5/16/2024 1:08 PM EDT    Workstation ID: [id number]                 I reviewed the patient's new clinical results.    Past Medical History:   Diagnosis Date    Asthma     Hyperlipidemia      Past Surgical History:   Procedure Laterality Date    HIP SURGERY Left          Medication Review:   Scheduled Meds:escitalopram, 10 mg, Oral, Daily  sodium chloride, 10 mL, Intravenous, Q12H      Continuous Infusions:sodium chloride 0.9 % with KCl 20 mEq, 100 mL/hr, Last Rate: 100 mL/hr (05/16/24 2055)      PRN Meds:.  acetaminophen    aluminum-magnesium hydroxide-simethicone    senna-docusate sodium **AND** polyethylene glycol **AND** bisacodyl **AND** bisacodyl    calcium carbonate    HYDROcodone-acetaminophen    Morphine    nitroglycerin    ondansetron    ondansetron ODT **OR** ondansetron    [COMPLETED] Insert Peripheral IV **AND** sodium chloride    sodium chloride    sodium chloride    traMADol     Assessment & Plan   ACUTE RIGHT HIP FRACTURE---ORTHO CONSULTATION--SURGERY PLANNED TODAY AT 5PM WITH DR REGALADO; PAIN MANAGEMENT; PT; INPT REHAB AT [facility] OR [facility] PLANNED;CASE MANAGEMENT NOTIFIED;FOOT PUMPS  LOW BP YESTERDAY---THOUGHT TO BE INTRAVASCULARLY DRY AND RESPONDED WELL TO HYDRATION; STABLE  PRIOR LEFT TOTAL HIP REPLACEMENT WITH ANTERIOR APPROACH---DID WELL.   OSTEOPOROSIS---ON PROLIA, VIT D, AND CALCIUM--LOW LEVEL HERE  DYSLIPIDEMIA--NEAR GOAL  ALLERGIC ASTHMA---STABLE  MINNIE---USES CPAP  REGULARLY  VEGAN  FULL CODE STATUS  Principal Problem:    Closed right hip fracture          Plan for disposition:TO [facility] OR [facility] FOR INPT REHAB AFTER SURGERY    Brynn Galicia MD  05/17/24  08:22 EDT

## 2024-05-17 NOTE — PLAN OF CARE
Goal Outcome Evaluation:  Plan of Care Reviewed With: patient        Progress: no change          Pt with complaints of nausea and pain. Controlled per MAR. A&OX4 and able to make needs known. Call light and personal items within reach. Plan of care ongoing.

## 2024-05-17 NOTE — NURSING NOTE
Dr Galicia  called OR and spoke to Lisette regarding Dr Hansen and surgery schedule. Lisette denied knowing patient and need for surgery. Dr Galicia asked for CRN of OR.

## 2024-05-17 NOTE — CONSULTS
Orthopedic Consult    Patient: Irene Ospina                                           YOB: 1955     Date of Admission: 5/16/2024 12:02 PM            Medical Record Number: [id number]    Attending Physician: Brynn Galicia MD    Consulting Physician: Teri Hansen MD    Reason for Consult: Right hip fracture.    History of Present Illness: 68 y.o. female admitted to [facility] with Closed right hip fracture [S72.001A]  Closed subcapital fracture of right femur, initial encounter [S72.011A]  Fall, initial encounter [W19.XXXA].     The patient was evaluated in the emergency room and was diagnosed with a  hip fracture.   Secondary to the age / multiple medical co morbidities the patient was admitted to the hospitalist.     As I was on call for the emergency room I was consulted for further evaluation and treatment.     The patient was in the usual state of health and fell from standing height, Resulting in sudden onset hip pain and inability to ambulate.   Denies any history of loss of consciousness, headache, vomiting, or seizures.   Denies any other injuries.   The patient is accompanied by her  family members  to this hospital visit.     The patient does acknowledge some on and off pre-existing pain in the hip.  Patient is a community ambulator. Patient denies  walker/cane assistive device.   The patient  lives at home with her , is quite active and independent in activities of daily living.  The patient denies history of dementia.    Patient denies any history of: DVT/PE, MRSA, COPD, CHF, CAD, Diabetes mellitus, Dementia or A-Fib.   The patient has history of :  The patient is not on anticoagulants:     Past medical history, past surgical history, social history, family history, ALLERGIES, current medications have been reviewed by me.    Past Medical History:   Diagnosis Date    Asthma     Hyperlipidemia      Past Surgical History:   Procedure Laterality Date     HIP SURGERY Left      Social History     Occupational History    Not on file   Tobacco Use    Smoking status: Never     Passive exposure: Never    Smokeless tobacco: Never   Vaping Use    Vaping status: Never Used   Substance and Sexual Activity    Alcohol use: Yes     Comment: occassionaly    Drug use: Never    Sexual activity: Defer      Social History     Social History Narrative    Not on file     History reviewed. No pertinent family history.     Allergies   Allergen Reactions    Ketorolac Tromethamine Anaphylaxis       Home Medications:  Medications Prior to Admission   Medication Sig Dispense Refill Last Dose    escitalopram (LEXAPRO) 10 MG tablet Take 1 tablet by mouth Daily.          Current Medications:  Scheduled Meds:escitalopram, 10 mg, Oral, Daily  sodium chloride, 10 mL, Intravenous, Q12H      Continuous Infusions:sodium chloride 0.9 % with KCl 20 mEq, 100 mL/hr, Last Rate: 100 mL/hr (05/17/24 0856)      PRN Meds:.  acetaminophen    aluminum-magnesium hydroxide-simethicone    senna-docusate sodium **AND** polyethylene glycol **AND** bisacodyl **AND** bisacodyl    calcium carbonate    HYDROcodone-acetaminophen    Morphine    nitroglycerin    ondansetron    ondansetron ODT **OR** ondansetron    [COMPLETED] Insert Peripheral IV **AND** sodium chloride    sodium chloride    sodium chloride    traMADol    Review of Systems:   A 12 point system review was reviewed with the patient and from the chart  and is negative except as in history of present illness.      Physical Exam: 68 y.o. female                    Vitals:    05/17/24 0057 05/17/24 0348 05/17/24 0823 05/17/24 1151   BP: 112/64 113/70 107/57 106/55   BP Location: Right arm Left arm Left arm Left arm   Patient Position: Lying Lying Lying Lying   Pulse: 65 67 59    Resp: 20 16 12 12   Temp: 97.6 °F (36.4 °C) 98.8 °F (37.1 °C) 97.5 °F (36.4 °C) 98.5 °F (36.9 °C)   TempSrc: Oral Oral Oral Axillary   SpO2: 97% 96%  92%   Weight:       Height:         "    Gait: Could not be tested , patient is nonambulatory.    Mental/HEENT/cardio/skin: The patient's general appearance was well-nourished, well-hydrated, no acute distress.  Orientation was alert and oriented ×3.  The patient's mood was normal.   Pulmonary exam shows normal late exchange, no labored breathing, or shortness of breath.    The skin exam showed normal temperature and color in the area of examination.    Extremities: Right   lower extremity positive shortening, positive external rotation, attempted movements of the  hip are painful and restricted. The patient is able to do gentle active range of motion of her toes. Gross sensation is intact over the toes.    Pulses: Pulses palpable and equal bilaterally    Diagnostic Tests:    Results from last 7 days   Lab Units 05/17/24  0347 05/16/24  1359   WBC 10*3/mm3 11.09* 11.01*   HEMOGLOBIN g/dL 11.5* 11.0*   HEMATOCRIT % 35.0 34.5   PLATELETS 10*3/mm3 222 199     Results from last 7 days   Lab Units 05/17/24  0347 05/16/24  1359   SODIUM mmol/L 136 139   POTASSIUM mmol/L 4.6 4.0   CHLORIDE mmol/L 103 103   CO2 mmol/L 22.0 23.0   BUN mg/dL 15 12   CREATININE mg/dL 0.88 0.79   GLUCOSE mg/dL 125* 106*   CALCIUM mg/dL 8.5* 9.1     Results from last 7 days   Lab Units 05/17/24  0347 05/16/24  1359   INR  0.98 0.94   APTT seconds 26.1 21.7*     No results found for: \"URICACID\"  No results found for: \"CRYSTAL\"  Microbiology Results (last 10 days)       ** No results found for the last 240 hours. **          XR Pelvis 1 or 2 View    Result Date: 5/17/2024  Impression: Stable appearance of a nondisplaced, impacted right subcapital femoral neck fracture. Electronically Signed: Ladan Ray MD  5/17/2024 2:02 PM EDT  Workstation ID: [id number]    XR Chest 1 View    Result Date: 5/16/2024  Impression: 1.No acute radiographic abnormality is identified. Electronically Signed: Jm Tovar MD  5/16/2024 4:03 PM EDT  Workstation ID: [id number]    XR Hip With or Without " Pelvis 2 - 3 View Right    Result Date: 5/16/2024  Impression: Impacted subcapital right hip fracture. Electronically Signed: Lidya Arreola MD  5/16/2024 1:08 PM EDT  Workstation ID: [id number]     Assessment: Right intracapsular Hip Fracture. Orion type 3      Closed right hip fracture      Plan:    Options and alternatives have been discussed in detail with patient and  family.   The patient is indicated for a  partial or total hip arthroplasty.    The likely,  Risks and benefits of the procedure including but not limited to infection, DVT, pulmonary embolism,  leg length discrepancy, recurrent dislocation, possibility of injury to nerves or vessels, possibility of periprosthetic fractures have been discussed in detail.  Despite the risks involved, The patient and patient's family  would like to proceed.     The patient is being scheduled for a right total hip arthroplasty by  approach at [facility] tentatively for May 18 2024.     Patient will be made NPO.   Obtain informed consent.     The patient's admitting service has seen the patient and the patient is cleared to the operating room.    Date: 5/17/2024    Teri Hansen MD    CC: Brynn aGlicia MD; MD Grayson Mccann Anna M, MD

## 2024-05-18 ENCOUNTER — APPOINTMENT (OUTPATIENT)
Dept: GENERAL RADIOLOGY | Facility: HOSPITAL | Age: 69
End: 2024-05-18
Payer: MEDICARE

## 2024-05-18 PROBLEM — S72.001A CLOSED RIGHT HIP FRACTURE: Status: RESOLVED | Noted: 2024-05-16 | Resolved: 2024-05-18

## 2024-05-18 PROBLEM — Z96.641 STATUS POST TOTAL HIP REPLACEMENT, RIGHT: Status: ACTIVE | Noted: 2024-05-18

## 2024-05-18 LAB
ANION GAP SERPL CALCULATED.3IONS-SCNC: 9 MMOL/L (ref 5–15)
BASOPHILS # BLD AUTO: 0.04 10*3/MM3 (ref 0–0.2)
BASOPHILS NFR BLD AUTO: 0.4 % (ref 0–1.5)
BUN SERPL-MCNC: 12 MG/DL (ref 8–23)
BUN/CREAT SERPL: 16.4 (ref 7–25)
CALCIUM SPEC-SCNC: 8.2 MG/DL (ref 8.6–10.5)
CHLORIDE SERPL-SCNC: 101 MMOL/L (ref 98–107)
CO2 SERPL-SCNC: 24 MMOL/L (ref 22–29)
CREAT SERPL-MCNC: 0.73 MG/DL (ref 0.57–1)
DEPRECATED RDW RBC AUTO: 41.2 FL (ref 37–54)
EGFRCR SERPLBLD CKD-EPI 2021: 88.6 ML/MIN/1.73
EOSINOPHIL # BLD AUTO: 0.3 10*3/MM3 (ref 0–0.4)
EOSINOPHIL NFR BLD AUTO: 2.8 % (ref 0.3–6.2)
ERYTHROCYTE [DISTWIDTH] IN BLOOD BY AUTOMATED COUNT: 11.9 % (ref 12.3–15.4)
GLUCOSE SERPL-MCNC: 105 MG/DL (ref 65–99)
HCT VFR BLD AUTO: 33.7 % (ref 34–46.6)
HGB BLD-MCNC: 10.8 G/DL (ref 12–15.9)
IMM GRANULOCYTES # BLD AUTO: 0.05 10*3/MM3 (ref 0–0.05)
IMM GRANULOCYTES NFR BLD AUTO: 0.5 % (ref 0–0.5)
LYMPHOCYTES # BLD AUTO: 1.36 10*3/MM3 (ref 0.7–3.1)
LYMPHOCYTES NFR BLD AUTO: 12.8 % (ref 19.6–45.3)
MCH RBC QN AUTO: 29.8 PG (ref 26.6–33)
MCHC RBC AUTO-ENTMCNC: 32 G/DL (ref 31.5–35.7)
MCV RBC AUTO: 93.1 FL (ref 79–97)
MONOCYTES # BLD AUTO: 0.91 10*3/MM3 (ref 0.1–0.9)
MONOCYTES NFR BLD AUTO: 8.5 % (ref 5–12)
NEUTROPHILS NFR BLD AUTO: 7.99 10*3/MM3 (ref 1.7–7)
NEUTROPHILS NFR BLD AUTO: 75 % (ref 42.7–76)
NRBC BLD AUTO-RTO: 0 /100 WBC (ref 0–0.2)
PLATELET # BLD AUTO: 206 10*3/MM3 (ref 140–450)
PMV BLD AUTO: 9.4 FL (ref 6–12)
POTASSIUM SERPL-SCNC: 4.7 MMOL/L (ref 3.5–5.2)
RBC # BLD AUTO: 3.62 10*6/MM3 (ref 3.77–5.28)
SODIUM SERPL-SCNC: 134 MMOL/L (ref 136–145)
WBC NRBC COR # BLD AUTO: 10.65 10*3/MM3 (ref 3.4–10.8)

## 2024-05-18 PROCEDURE — 73502 X-RAY EXAM HIP UNI 2-3 VIEWS: CPT

## 2024-05-18 PROCEDURE — C1776 JOINT DEVICE (IMPLANTABLE): HCPCS | Performed by: ORTHOPAEDIC SURGERY

## 2024-05-18 PROCEDURE — 25010000002 FENTANYL CITRATE (PF) 100 MCG/2ML SOLUTION: Performed by: NURSE ANESTHETIST, CERTIFIED REGISTERED

## 2024-05-18 PROCEDURE — C1889 IMPLANT/INSERT DEVICE, NOC: HCPCS | Performed by: ORTHOPAEDIC SURGERY

## 2024-05-18 PROCEDURE — 80048 BASIC METABOLIC PNL TOTAL CA: CPT | Performed by: ORTHOPAEDIC SURGERY

## 2024-05-18 PROCEDURE — 25010000002 CEFAZOLIN PER 500 MG: Performed by: NURSE ANESTHETIST, CERTIFIED REGISTERED

## 2024-05-18 PROCEDURE — 94761 N-INVAS EAR/PLS OXIMETRY MLT: CPT

## 2024-05-18 PROCEDURE — 25010000002 HYDROMORPHONE 1 MG/ML SOLUTION: Performed by: NURSE ANESTHETIST, CERTIFIED REGISTERED

## 2024-05-18 PROCEDURE — 76000 FLUOROSCOPY <1 HR PHYS/QHP: CPT

## 2024-05-18 PROCEDURE — 25010000002 ROPIVACAINE PER 1 MG: Performed by: ORTHOPAEDIC SURGERY

## 2024-05-18 PROCEDURE — 25010000002 HYDROMORPHONE 1 MG/ML SOLUTION: Performed by: ORTHOPAEDIC SURGERY

## 2024-05-18 PROCEDURE — 85025 COMPLETE CBC W/AUTO DIFF WBC: CPT | Performed by: ORTHOPAEDIC SURGERY

## 2024-05-18 PROCEDURE — 25010000002 SUGAMMADEX 200 MG/2ML SOLUTION: Performed by: NURSE ANESTHETIST, CERTIFIED REGISTERED

## 2024-05-18 PROCEDURE — 25010000002 ONDANSETRON PER 1 MG: Performed by: NURSE ANESTHETIST, CERTIFIED REGISTERED

## 2024-05-18 PROCEDURE — 25010000002 DEXAMETHASONE PER 1 MG: Performed by: NURSE ANESTHETIST, CERTIFIED REGISTERED

## 2024-05-18 PROCEDURE — 25810000003 SODIUM CHLORIDE 0.9 % SOLUTION: Performed by: ORTHOPAEDIC SURGERY

## 2024-05-18 PROCEDURE — 0SR9049 REPLACEMENT OF RIGHT HIP JOINT WITH CERAMIC ON POLYETHYLENE SYNTHETIC SUBSTITUTE, CEMENTED, OPEN APPROACH: ICD-10-PCS | Performed by: ORTHOPAEDIC SURGERY

## 2024-05-18 PROCEDURE — 25810000003 LACTATED RINGERS PER 1000 ML: Performed by: NURSE ANESTHETIST, CERTIFIED REGISTERED

## 2024-05-18 PROCEDURE — 25010000002 CEFAZOLIN PER 500 MG: Performed by: ORTHOPAEDIC SURGERY

## 2024-05-18 PROCEDURE — 25010000002 PROPOFOL 200 MG/20ML EMULSION: Performed by: NURSE ANESTHETIST, CERTIFIED REGISTERED

## 2024-05-18 PROCEDURE — 25010000002 MAGNESIUM SULFATE PER 500 MG OF MAGNESIUM: Performed by: NURSE ANESTHETIST, CERTIFIED REGISTERED

## 2024-05-18 PROCEDURE — 25010000002 SODIUM CHLORIDE 0.9 % WITH KCL 20 MEQ 20-0.9 MEQ/L-% SOLUTION: Performed by: FAMILY MEDICINE

## 2024-05-18 PROCEDURE — 25010000002 MORPHINE PER 10 MG: Performed by: FAMILY MEDICINE

## 2024-05-18 DEVICE — BIOLOX DELTA CERAMIC FEMORAL HEAD +1.5 36MM DIA 12/14 TAPER
Type: IMPLANTABLE DEVICE | Site: HIP | Status: FUNCTIONAL
Brand: BIOLOX DELTA

## 2024-05-18 DEVICE — DEV CONTRL TISS STRATAFIX SPIRAL MNCRYL UD 3/0 PLS 30CM: Type: IMPLANTABLE DEVICE | Site: HIP | Status: FUNCTIONAL

## 2024-05-18 DEVICE — EMPHASYS ACETABULAR SHELL THREE-HOLE 50MM CEMENTLESS
Type: IMPLANTABLE DEVICE | Site: HIP | Status: FUNCTIONAL
Brand: EMPHASYS

## 2024-05-18 DEVICE — TOTL HIP COP DEPUY 9641334: Type: IMPLANTABLE DEVICE | Site: HIP | Status: FUNCTIONAL

## 2024-05-18 DEVICE — DEV CONTRL TISS STRATAFIX SPIRAL PDS PLS CT1 2-0 1/2 30CM: Type: IMPLANTABLE DEVICE | Site: HIP | Status: FUNCTIONAL

## 2024-05-18 DEVICE — EMPHASYS POLYETHYLENE LINER AOX NEUTRAL 50MM 36MM
Type: IMPLANTABLE DEVICE | Site: HIP | Status: FUNCTIONAL
Brand: EMPHASYS

## 2024-05-18 DEVICE — ACTIS DUOFIX HIP PROSTHESIS (FEMORAL STEM 12/14 TAPER CEMENTLESS SIZE 6 STD COLLAR)  CE
Type: IMPLANTABLE DEVICE | Site: HIP | Status: FUNCTIONAL
Brand: ACTIS

## 2024-05-18 DEVICE — HEMOST ABS SURGIFOAM SZ100 8X12 10MM: Type: IMPLANTABLE DEVICE | Site: HIP | Status: FUNCTIONAL

## 2024-05-18 DEVICE — SUT FW #2 W/TPR NDL 1/2 CIR 38IN 97CM 26.5MM BLU: Type: IMPLANTABLE DEVICE | Site: HIP | Status: FUNCTIONAL

## 2024-05-18 RX ORDER — HYDRALAZINE HYDROCHLORIDE 20 MG/ML
5 INJECTION INTRAMUSCULAR; INTRAVENOUS
Status: DISCONTINUED | OUTPATIENT
Start: 2024-05-18 | End: 2024-05-18 | Stop reason: HOSPADM

## 2024-05-18 RX ORDER — DOCUSATE SODIUM 100 MG/1
100 CAPSULE, LIQUID FILLED ORAL 2 TIMES DAILY
Qty: 32 CAPSULE | Refills: 0 | Status: DISCONTINUED | OUTPATIENT
Start: 2024-05-18 | End: 2024-05-19 | Stop reason: SDUPTHER

## 2024-05-18 RX ORDER — ASPIRIN 81 MG/1
81 TABLET ORAL DAILY
Status: DISCONTINUED | OUTPATIENT
Start: 2024-05-19 | End: 2024-05-21 | Stop reason: HOSPADM

## 2024-05-18 RX ORDER — NALOXONE HCL 0.4 MG/ML
0.1 VIAL (ML) INJECTION
Status: DISCONTINUED | OUTPATIENT
Start: 2024-05-18 | End: 2024-05-21 | Stop reason: HOSPADM

## 2024-05-18 RX ORDER — PREGABALIN 75 MG/1
75 CAPSULE ORAL ONCE
Status: DISCONTINUED | OUTPATIENT
Start: 2024-05-18 | End: 2024-05-18 | Stop reason: HOSPADM

## 2024-05-18 RX ORDER — FLUMAZENIL 0.1 MG/ML
0.2 INJECTION INTRAVENOUS AS NEEDED
Status: DISCONTINUED | OUTPATIENT
Start: 2024-05-18 | End: 2024-05-18 | Stop reason: HOSPADM

## 2024-05-18 RX ORDER — ONDANSETRON 2 MG/ML
4 INJECTION INTRAMUSCULAR; INTRAVENOUS ONCE AS NEEDED
Status: DISCONTINUED | OUTPATIENT
Start: 2024-05-18 | End: 2024-05-18 | Stop reason: HOSPADM

## 2024-05-18 RX ORDER — SODIUM CHLORIDE, SODIUM LACTATE, POTASSIUM CHLORIDE, CALCIUM CHLORIDE 600; 310; 30; 20 MG/100ML; MG/100ML; MG/100ML; MG/100ML
INJECTION, SOLUTION INTRAVENOUS CONTINUOUS PRN
Status: DISCONTINUED | OUTPATIENT
Start: 2024-05-18 | End: 2024-05-18 | Stop reason: SURG

## 2024-05-18 RX ORDER — OXYCODONE HYDROCHLORIDE 5 MG/1
10 TABLET ORAL EVERY 4 HOURS PRN
Status: DISCONTINUED | OUTPATIENT
Start: 2024-05-18 | End: 2024-05-18 | Stop reason: HOSPADM

## 2024-05-18 RX ORDER — LIDOCAINE HYDROCHLORIDE 10 MG/ML
INJECTION, SOLUTION EPIDURAL; INFILTRATION; INTRACAUDAL; PERINEURAL AS NEEDED
Status: DISCONTINUED | OUTPATIENT
Start: 2024-05-18 | End: 2024-05-18 | Stop reason: SURG

## 2024-05-18 RX ORDER — DIPHENHYDRAMINE HYDROCHLORIDE 50 MG/ML
12.5 INJECTION INTRAMUSCULAR; INTRAVENOUS ONCE AS NEEDED
Status: DISCONTINUED | OUTPATIENT
Start: 2024-05-18 | End: 2024-05-18 | Stop reason: HOSPADM

## 2024-05-18 RX ORDER — PROPOFOL 10 MG/ML
INJECTION, EMULSION INTRAVENOUS AS NEEDED
Status: DISCONTINUED | OUTPATIENT
Start: 2024-05-18 | End: 2024-05-18 | Stop reason: SURG

## 2024-05-18 RX ORDER — ROCURONIUM BROMIDE 10 MG/ML
INJECTION, SOLUTION INTRAVENOUS AS NEEDED
Status: DISCONTINUED | OUTPATIENT
Start: 2024-05-18 | End: 2024-05-18 | Stop reason: SURG

## 2024-05-18 RX ORDER — NALOXONE HCL 0.4 MG/ML
0.4 VIAL (ML) INJECTION AS NEEDED
Status: DISCONTINUED | OUTPATIENT
Start: 2024-05-18 | End: 2024-05-18 | Stop reason: HOSPADM

## 2024-05-18 RX ORDER — DEXAMETHASONE SODIUM PHOSPHATE 4 MG/ML
INJECTION, SOLUTION INTRA-ARTICULAR; INTRALESIONAL; INTRAMUSCULAR; INTRAVENOUS; SOFT TISSUE AS NEEDED
Status: DISCONTINUED | OUTPATIENT
Start: 2024-05-18 | End: 2024-05-18 | Stop reason: SURG

## 2024-05-18 RX ORDER — LABETALOL HYDROCHLORIDE 5 MG/ML
5 INJECTION, SOLUTION INTRAVENOUS
Status: DISCONTINUED | OUTPATIENT
Start: 2024-05-18 | End: 2024-05-18 | Stop reason: HOSPADM

## 2024-05-18 RX ORDER — MEPERIDINE HYDROCHLORIDE 25 MG/ML
12.5 INJECTION INTRAMUSCULAR; INTRAVENOUS; SUBCUTANEOUS
Status: DISCONTINUED | OUTPATIENT
Start: 2024-05-18 | End: 2024-05-18 | Stop reason: HOSPADM

## 2024-05-18 RX ORDER — MAGNESIUM SULFATE HEPTAHYDRATE 500 MG/ML
INJECTION, SOLUTION INTRAMUSCULAR; INTRAVENOUS AS NEEDED
Status: DISCONTINUED | OUTPATIENT
Start: 2024-05-18 | End: 2024-05-18 | Stop reason: SURG

## 2024-05-18 RX ORDER — TRANEXAMIC ACID 100 MG/ML
INJECTION, SOLUTION INTRAVENOUS AS NEEDED
Status: DISCONTINUED | OUTPATIENT
Start: 2024-05-18 | End: 2024-05-18 | Stop reason: SURG

## 2024-05-18 RX ORDER — EPHEDRINE SULFATE 5 MG/ML
INJECTION INTRAVENOUS AS NEEDED
Status: DISCONTINUED | OUTPATIENT
Start: 2024-05-18 | End: 2024-05-18 | Stop reason: SURG

## 2024-05-18 RX ORDER — DIPHENHYDRAMINE HYDROCHLORIDE 50 MG/ML
12.5 INJECTION INTRAMUSCULAR; INTRAVENOUS
Status: DISCONTINUED | OUTPATIENT
Start: 2024-05-18 | End: 2024-05-18 | Stop reason: HOSPADM

## 2024-05-18 RX ORDER — SODIUM CHLORIDE 9 MG/ML
100 INJECTION, SOLUTION INTRAVENOUS CONTINUOUS
Status: DISCONTINUED | OUTPATIENT
Start: 2024-05-18 | End: 2024-05-18

## 2024-05-18 RX ORDER — TRANEXAMIC ACID 10 MG/ML
1000 INJECTION, SOLUTION INTRAVENOUS ONCE
Status: DISCONTINUED | OUTPATIENT
Start: 2024-05-18 | End: 2024-05-18 | Stop reason: HOSPADM

## 2024-05-18 RX ORDER — IPRATROPIUM BROMIDE AND ALBUTEROL SULFATE 2.5; .5 MG/3ML; MG/3ML
3 SOLUTION RESPIRATORY (INHALATION) ONCE AS NEEDED
Status: DISCONTINUED | OUTPATIENT
Start: 2024-05-18 | End: 2024-05-18 | Stop reason: HOSPADM

## 2024-05-18 RX ORDER — OXYCODONE HYDROCHLORIDE 5 MG/1
5 TABLET ORAL ONCE AS NEEDED
Status: COMPLETED | OUTPATIENT
Start: 2024-05-18 | End: 2024-05-18

## 2024-05-18 RX ORDER — ONDANSETRON 2 MG/ML
INJECTION INTRAMUSCULAR; INTRAVENOUS AS NEEDED
Status: DISCONTINUED | OUTPATIENT
Start: 2024-05-18 | End: 2024-05-18 | Stop reason: SURG

## 2024-05-18 RX ORDER — FENTANYL CITRATE 50 UG/ML
INJECTION, SOLUTION INTRAMUSCULAR; INTRAVENOUS AS NEEDED
Status: DISCONTINUED | OUTPATIENT
Start: 2024-05-18 | End: 2024-05-18 | Stop reason: SURG

## 2024-05-18 RX ORDER — EPHEDRINE SULFATE 5 MG/ML
5 INJECTION INTRAVENOUS ONCE AS NEEDED
Status: DISCONTINUED | OUTPATIENT
Start: 2024-05-18 | End: 2024-05-18 | Stop reason: HOSPADM

## 2024-05-18 RX ADMIN — DOCUSATE SODIUM 100 MG: 100 CAPSULE, LIQUID FILLED ORAL at 21:05

## 2024-05-18 RX ADMIN — MORPHINE SULFATE 4 MG: 4 INJECTION, SOLUTION INTRAMUSCULAR; INTRAVENOUS at 01:17

## 2024-05-18 RX ADMIN — MORPHINE SULFATE 4 MG: 4 INJECTION, SOLUTION INTRAMUSCULAR; INTRAVENOUS at 05:35

## 2024-05-18 RX ADMIN — SODIUM CHLORIDE, SODIUM LACTATE, POTASSIUM CHLORIDE, AND CALCIUM CHLORIDE: .6; .31; .03; .02 INJECTION, SOLUTION INTRAVENOUS at 08:27

## 2024-05-18 RX ADMIN — Medication 10 ML: at 21:05

## 2024-05-18 RX ADMIN — SODIUM CHLORIDE 2000 MG: 900 INJECTION INTRAVENOUS at 20:04

## 2024-05-18 RX ADMIN — MAGNESIUM SULFATE HEPTAHYDRATE 1 G: 500 INJECTION, SOLUTION INTRAMUSCULAR; INTRAVENOUS at 08:43

## 2024-05-18 RX ADMIN — FENTANYL CITRATE 50 MCG: 50 INJECTION, SOLUTION INTRAMUSCULAR; INTRAVENOUS at 08:39

## 2024-05-18 RX ADMIN — HYDROMORPHONE HYDROCHLORIDE 0.5 MG: 1 INJECTION, SOLUTION INTRAMUSCULAR; INTRAVENOUS; SUBCUTANEOUS at 22:30

## 2024-05-18 RX ADMIN — ONDANSETRON 4 MG: 2 INJECTION INTRAMUSCULAR; INTRAVENOUS at 08:43

## 2024-05-18 RX ADMIN — CEFAZOLIN 2 G: 2 INJECTION, POWDER, FOR SOLUTION INTRAMUSCULAR; INTRAVENOUS at 11:15

## 2024-05-18 RX ADMIN — LIDOCAINE HYDROCHLORIDE 100 MG: 10 INJECTION, SOLUTION EPIDURAL; INFILTRATION; INTRACAUDAL; PERINEURAL at 08:32

## 2024-05-18 RX ADMIN — SUGAMMADEX 200 MG: 100 INJECTION, SOLUTION INTRAVENOUS at 10:00

## 2024-05-18 RX ADMIN — OXYCODONE 5 MG: 5 TABLET ORAL at 10:54

## 2024-05-18 RX ADMIN — SODIUM CHLORIDE AND POTASSIUM CHLORIDE 100 ML/HR: .9; .15 SOLUTION INTRAVENOUS at 07:17

## 2024-05-18 RX ADMIN — HYDROMORPHONE HYDROCHLORIDE 0.5 MG: 1 INJECTION, SOLUTION INTRAMUSCULAR; INTRAVENOUS; SUBCUTANEOUS at 10:41

## 2024-05-18 RX ADMIN — DEXAMETHASONE SODIUM PHOSPHATE 8 MG: 4 INJECTION, SOLUTION INTRAMUSCULAR; INTRAVENOUS at 08:43

## 2024-05-18 RX ADMIN — SODIUM CHLORIDE 100 ML/HR: 9 INJECTION, SOLUTION INTRAVENOUS at 13:32

## 2024-05-18 RX ADMIN — HYDROCODONE BITARTRATE AND ACETAMINOPHEN 1 TABLET: 7.5; 325 TABLET ORAL at 17:59

## 2024-05-18 RX ADMIN — EPHEDRINE SULFATE 10 MG: 5 INJECTION INTRAVENOUS at 09:25

## 2024-05-18 RX ADMIN — EPHEDRINE SULFATE 10 MG: 5 INJECTION INTRAVENOUS at 08:43

## 2024-05-18 RX ADMIN — PROPOFOL 125 MCG/KG/MIN: 10 INJECTION, EMULSION INTRAVENOUS at 08:36

## 2024-05-18 RX ADMIN — PROPOFOL 200 MG: 10 INJECTION, EMULSION INTRAVENOUS at 08:32

## 2024-05-18 RX ADMIN — CEFAZOLIN 2 G: 2 INJECTION, POWDER, FOR SOLUTION INTRAMUSCULAR; INTRAVENOUS at 08:36

## 2024-05-18 RX ADMIN — TRANEXAMIC ACID 1000 MG: 100 INJECTION, SOLUTION INTRAVENOUS at 08:36

## 2024-05-18 RX ADMIN — HYDROCODONE BITARTRATE AND ACETAMINOPHEN 1 TABLET: 7.5; 325 TABLET ORAL at 02:18

## 2024-05-18 RX ADMIN — ROCURONIUM BROMIDE 10 MG: 10 INJECTION, SOLUTION INTRAVENOUS at 09:26

## 2024-05-18 RX ADMIN — FENTANYL CITRATE 50 MCG: 50 INJECTION, SOLUTION INTRAMUSCULAR; INTRAVENOUS at 08:32

## 2024-05-18 NOTE — ANESTHESIA PREPROCEDURE EVALUATION
Anesthesia Evaluation     Patient summary reviewed and Nursing notes reviewed   NPO Solid Status: > 8 hours  NPO Liquid Status: > 2 hours           Airway   Mallampati: II  TM distance: >3 FB  Neck ROM: full  No difficulty expected  Dental      Pulmonary - normal exam   (+) asthma,  Cardiovascular - normal exam  Exercise tolerance: poor (<4 METS)    ECG reviewed    (+) hyperlipidemia      Neuro/Psych  GI/Hepatic/Renal/Endo - negative ROS     Musculoskeletal     Abdominal  - normal exam   Substance History      OB/GYN          Other   blood dyscrasia anemia,     ROS/Med Hx Other: Right hip fracture after a fall                Anesthesia Plan    ASA 2     general     intravenous induction     Anesthetic plan, risks, benefits, and alternatives have been provided, discussed and informed consent has been obtained with: patient.  Pre-procedure education provided  Plan discussed with CRNA.      CODE STATUS:    Level Of Support Discussed With: Patient  Code Status (Patient has no pulse and is not breathing): CPR (Attempt to Resuscitate)  Medical Interventions (Patient has pulse or is breathing): Full Support

## 2024-05-18 NOTE — ANESTHESIA POSTPROCEDURE EVALUATION
Patient: Irene Ospina    Procedure Summary       Date: 05/18/24 Room / Location: [facility] OR 11 / [facility] MAIN OR    Anesthesia Start: 0827 Anesthesia Stop: 1020    Procedure: TOTAL HIP ARTHROPLASTY ANTERIOR (Right: Hip) Diagnosis:     Surgeons: Teri Hansen MD Provider: Da Bourgeois MD    Anesthesia Type: general ASA Status: 2            Anesthesia Type: general    Vitals  Vitals Value Taken Time   /53 05/18/24 1105   Temp 98.1 °F (36.7 °C) 05/18/24 1105   Pulse 75 05/18/24 1105   Resp 12 05/18/24 1105   SpO2 93 % 05/18/24 1105           Post Anesthesia Care and Evaluation    Patient location during evaluation: PACU  Patient participation: complete - patient participated  Level of consciousness: awake  Pain scale: See nurse's notes for pain score.  Pain management: adequate    Airway patency: patent  Anesthetic complications: No anesthetic complications  PONV Status: none  Cardiovascular status: acceptable  Respiratory status: acceptable and spontaneous ventilation  Hydration status: acceptable    Comments: Patient seen and examined postoperatively; vital signs stable; SpO2 greater than or equal to 90%; cardiopulmonary status stable; nausea/vomiting adequately controlled; pain adequately controlled; no apparent anesthesia complications; patient discharged from anesthesia care when discharge criteria were met

## 2024-05-18 NOTE — PROGRESS NOTES
"DATE/TIME OF ADMISSION:  5/16/2024 12:02 PM     LOS: 2 days   Patient Care Team:  Brynn Galicia MD as PCP - General (Family Medicine)  Consults       Date and Time Order Name Status Description    5/16/2024  2:21 PM Ortho (on-call MD unless specified)      5/16/2024  1:20 PM Family Medicine Consult              Subjective SURGERY THIS AM    Interval History: S/P RIGHT HIP FRACTURE    Patient Complaints: PAIN CONTROLLED PRE OP TODAY AND IS VERY COMFORTABLE POST OP  SHE HAS WALKED TO USE THE BEDSIDE COMMMODE  DOING WELL  MILD NAUSEA POST OP HAS RESOLVED    History taken from: patient    Review of Systems        Objective     Vital Signs  /53 (BP Location: Left arm, Patient Position: Lying)   Pulse 67   Temp 99.7 °F (37.6 °C) (Oral)   Resp 15   Ht 160 cm (63\")   Wt 73.1 kg (161 lb 2.5 oz)   SpO2 93%   BMI 28.55 kg/m²     Physical Exam:      General Appearance:    Alert, cooperative, in no acute distress   Head:    Normocephalic, without obvious abnormality, atraumatic   Eyes:            Lids and lashes normal, conjunctivae and sclerae normal, no   icterus, no pallor, corneas clear, PERRLA   Ears:    Ears appear intact with no abnormalities noted   Throat:   No oral lesions, no thrush, oral mucosa moist   Neck:   No adenopathy, supple, trachea midline, no thyromegaly, no   carotid bruit, no JVD   Lungs:     Clear to auscultation,respirations regular, even and                  unlabored    Heart:    Regular rhythm and normal rate, normal S1 and S2, no            murmur, no gallop, no rub, no click   Chest Wall:    No abnormalities observed; PECTUS EXCAVATUM+   Abdomen:     Normal bowel sounds, no masses, no organomegaly, soft        non-tender, non-distended, no guarding, no rebound                tenderness   Extremities:   Moves all extremities well, no edema, no cyanosis, no             redness; RIGHT ANTERIOR IP DRESSED---DRY  NO CALF TENDERNESS   Pulses:   Pulses palpable and equal bilaterally   Skin: "   No bleeding, bruising or rash   Lymph nodes:   No palpable adenopathy   Neurologic:   Grossly normal, alert and O x 3        I HAVE PERSONALLY EXAMINED AND REVIEWED THE PATIENT'S RECORD     Lab Results (last 48 hours)       Procedure Component Value Units Date/Time    Basic Metabolic Panel [[id number]]  (Abnormal) Collected: 05/18/24 0159    Specimen: Blood from Arm, Right Updated: 05/18/24 0230     Glucose 105 mg/dL      BUN 12 mg/dL      Creatinine 0.73 mg/dL      Sodium 134 mmol/L      Potassium 4.7 mmol/L      Chloride 101 mmol/L      CO2 24.0 mmol/L      Calcium 8.2 mg/dL      BUN/Creatinine Ratio 16.4     Anion Gap 9.0 mmol/L      eGFR 88.6 mL/min/1.73     Narrative:      GFR Normal >60  Chronic Kidney Disease <60  Kidney Failure <15      CBC & Differential [[id number]]  (Abnormal) Collected: 05/18/24 0159    Specimen: Blood from Arm, Right Updated: 05/18/24 0207    Narrative:      The following orders were created for panel order CBC & Differential.  Procedure                               Abnormality         Status                     ---------                               -----------         ------                     CBC Auto Differential[[id number]]        Abnormal            Final result                 Please view results for these tests on the individual orders.    CBC Auto Differential [[id number]]  (Abnormal) Collected: 05/18/24 0159    Specimen: Blood from Arm, Right Updated: 05/18/24 0207     WBC 10.65 10*3/mm3      RBC 3.62 10*6/mm3      Hemoglobin 10.8 g/dL      Hematocrit 33.7 %      MCV 93.1 fL      MCH 29.8 pg      MCHC 32.0 g/dL      RDW 11.9 %      RDW-SD 41.2 fl      MPV 9.4 fL      Platelets 206 10*3/mm3      Neutrophil % 75.0 %      Lymphocyte % 12.8 %      Monocyte % 8.5 %      Eosinophil % 2.8 %      Basophil % 0.4 %      Immature Grans % 0.5 %      Neutrophils, Absolute 7.99 10*3/mm3      Lymphocytes, Absolute 1.36 10*3/mm3      Monocytes, Absolute 0.91 10*3/mm3      Eosinophils,  Absolute 0.30 10*3/mm3      Basophils, Absolute 0.04 10*3/mm3      Immature Grans, Absolute 0.05 10*3/mm3      nRBC 0.0 /100 WBC     Comprehensive Metabolic Panel [[id number]]  (Abnormal) Collected: 05/17/24 0347    Specimen: Blood from Arm, Left Updated: 05/17/24 0418     Glucose 125 mg/dL      BUN 15 mg/dL      Creatinine 0.88 mg/dL      Sodium 136 mmol/L      Potassium 4.6 mmol/L      Chloride 103 mmol/L      CO2 22.0 mmol/L      Calcium 8.5 mg/dL      Total Protein 5.9 g/dL      Albumin 3.8 g/dL      ALT (SGPT) 13 U/L      AST (SGOT) 22 U/L      Alkaline Phosphatase 74 U/L      Total Bilirubin 0.5 mg/dL      Globulin 2.1 gm/dL      A/G Ratio 1.8 g/dL      BUN/Creatinine Ratio 17.0     Anion Gap 11.0 mmol/L      eGFR 70.8 mL/min/1.73     Narrative:      GFR Normal >60  Chronic Kidney Disease <60  Kidney Failure <15      Protime-INR [[id number]]  (Normal) Collected: 05/17/24 0347    Specimen: Blood from Arm, Left Updated: 05/17/24 0414     Protime 10.7 Seconds      INR 0.98    aPTT [[id number]]  (Normal) Collected: 05/17/24 0347    Specimen: Blood from Arm, Left Updated: 05/17/24 0414     PTT 26.1 seconds     CBC Auto Differential [[id number]]  (Abnormal) Collected: 05/17/24 0347    Specimen: Blood from Arm, Left Updated: 05/17/24 0357     WBC 11.09 10*3/mm3      RBC 3.83 10*6/mm3      Hemoglobin 11.5 g/dL      Hematocrit 35.0 %      MCV 91.4 fL      MCH 30.0 pg      MCHC 32.9 g/dL      RDW 11.9 %      RDW-SD 39.6 fl      MPV 9.3 fL      Platelets 222 10*3/mm3      Neutrophil % 78.2 %      Lymphocyte % 12.4 %      Monocyte % 7.1 %      Eosinophil % 1.5 %      Basophil % 0.4 %      Immature Grans % 0.4 %      Neutrophils, Absolute 8.68 10*3/mm3      Lymphocytes, Absolute 1.37 10*3/mm3      Monocytes, Absolute 0.79 10*3/mm3      Eosinophils, Absolute 0.17 10*3/mm3      Basophils, Absolute 0.04 10*3/mm3      Immature Grans, Absolute 0.04 10*3/mm3      nRBC 0.0 /100 WBC     Comprehensive Metabolic Panel [[id number]]   (Abnormal) Collected: 05/16/24 1359    Specimen: Blood Updated: 05/16/24 1441     Glucose 106 mg/dL      BUN 12 mg/dL      Creatinine 0.79 mg/dL      Sodium 139 mmol/L      Potassium 4.0 mmol/L      Comment: Slight hemolysis detected by analyzer. Result may be falsely elevated.        Chloride 103 mmol/L      CO2 23.0 mmol/L      Calcium 9.1 mg/dL      Total Protein 6.5 g/dL      Albumin 4.3 g/dL      ALT (SGPT) 17 U/L      AST (SGOT) 26 U/L      Comment: Slight hemolysis detected by analyzer. Result may be falsely elevated.        Alkaline Phosphatase 90 U/L      Total Bilirubin 0.3 mg/dL      Globulin 2.2 gm/dL      A/G Ratio 2.0 g/dL      BUN/Creatinine Ratio 15.2     Anion Gap 13.0 mmol/L      eGFR 80.6 mL/min/1.73     Narrative:      GFR Normal >60  Chronic Kidney Disease <60  Kidney Failure <15      Protime-INR [[id number]]  (Normal) Collected: 05/16/24 1359    Specimen: Blood Updated: 05/16/24 1417     Protime 10.3 Seconds      INR 0.94    aPTT [[id number]]  (Abnormal) Collected: 05/16/24 1359    Specimen: Blood Updated: 05/16/24 1417     PTT 21.7 seconds     CBC & Differential [[id number]]  (Abnormal) Collected: 05/16/24 1359    Specimen: Blood Updated: 05/16/24 1404    Narrative:      The following orders were created for panel order CBC & Differential.  Procedure                               Abnormality         Status                     ---------                               -----------         ------                     CBC Auto Differential[[id number]]        Abnormal            Final result                 Please view results for these tests on the individual orders.    CBC Auto Differential [[id number]]  (Abnormal) Collected: 05/16/24 1359    Specimen: Blood Updated: 05/16/24 1404     WBC 11.01 10*3/mm3      RBC 3.68 10*6/mm3      Hemoglobin 11.0 g/dL      Hematocrit 34.5 %      MCV 93.8 fL      MCH 29.9 pg      MCHC 31.9 g/dL      RDW 11.8 %      RDW-SD 40.9 fl      MPV 9.6 fL      Platelets 199  10*3/mm3      Neutrophil % 81.6 %      Lymphocyte % 11.3 %      Monocyte % 5.5 %      Eosinophil % 0.7 %      Basophil % 0.4 %      Immature Grans % 0.5 %      Neutrophils, Absolute 8.98 10*3/mm3      Lymphocytes, Absolute 1.24 10*3/mm3      Monocytes, Absolute 0.61 10*3/mm3      Eosinophils, Absolute 0.08 10*3/mm3      Basophils, Absolute 0.04 10*3/mm3      Immature Grans, Absolute 0.06 10*3/mm3      nRBC 0.0 /100 WBC              Imaging Results (Last 48 Hours)       Procedure Component Value Units Date/Time    XR Pelvis 1 or 2 View [[id number]] Collected: 05/17/24 1401     Updated: 05/17/24 1405    Narrative:      XR PELVIS 1 OR 2 VW    Date of Exam: 5/17/2024 12:22 PM EDT    Indication: fracture    Comparison: 5/16/2024.    Findings:  AP pelvis was obtained. There is a left total hip replacement. The impacted subcapital right femoral neck fracture is unchanged. The femoral head remains within the acetabulum.      Impression:      Impression:  Stable appearance of a nondisplaced, impacted right subcapital femoral neck fracture.      Electronically Signed: Ladan Ray MD    5/17/2024 2:02 PM EDT    Workstation ID: [id number]    XR Chest 1 View [[id number]] Collected: 05/16/24 1602     Updated: 05/16/24 1605    Narrative:      XR CHEST 1 VW    Date of Exam: 5/16/2024 3:54 PM EDT    Indication: Preop hip fracture    Comparison: 9/11/2016    Findings:  Metallic surgical hardware projects over the lower thorax, similar since 9/11/2016. The lungs appear adequately aerated without consolidation or mass. No pleural effusion or pneumothorax is identified. The cardiomediastinal silhouette and pulmonary   vasculature appear within normal limits. No acute or suspicious osseous lesion is identified. Bilateral breast implants are noted.      Impression:      Impression:  1.No acute radiographic abnormality is identified.      Electronically Signed: Jm Tovar MD    5/16/2024 4:03 PM EDT    Workstation ID: [id number]    XR  Hip With or Without Pelvis 2 - 3 View Right [[id number]] Collected: 05/16/24 1307     Updated: 05/16/24 1310    Narrative:      XR HIP W OR WO PELVIS 2-3 VIEW RIGHT    Date of Exam: 5/16/2024 12:30 PM EDT    Indication: Fall right hip pain    Comparison: 9/20/2016.    Findings:  3 views. There is a subcapital right hip fracture with impaction. The femoral head remains seated within the acetabulum. The bony pelvic ring is intact. There is a left hip arthroplasty.      Impression:      Impression:  Impacted subcapital right hip fracture.      Electronically Signed: Lidya Arreola MD    5/16/2024 1:08 PM EDT    Workstation ID: [id number]                 I reviewed the patient's new clinical results.    Past Medical History:   Diagnosis Date    Asthma     Hyperlipidemia      Past Surgical History:   Procedure Laterality Date    HIP SURGERY Left          Medication Review:   Scheduled Meds:[Transfer Hold] escitalopram, 10 mg, Oral, Daily  [Transfer Hold] sodium chloride, 10 mL, Intravenous, Q12H      Continuous Infusions:sodium chloride 0.9 % with KCl 20 mEq, 100 mL/hr, Last Rate: 100 mL/hr (05/18/24 0717)      PRN Meds:.  [Transfer Hold] acetaminophen    [Transfer Hold] aluminum-magnesium hydroxide-simethicone    atropine    [Transfer Hold] senna-docusate sodium **AND** [Transfer Hold] polyethylene glycol **AND** [Transfer Hold] bisacodyl **AND** [Transfer Hold] bisacodyl    [Transfer Hold] calcium carbonate    diphenhydrAMINE    diphenhydrAMINE    ePHEDrine Sulfate (Pressors)    flumazenil    gelatin absorbable    hydrALAZINE    [Transfer Hold] HYDROcodone-acetaminophen    HYDROmorphone    HYDROmorphone    ipratropium-albuterol    labetalol    lidocaine (cardiac)    meperidine    [Transfer Hold] Morphine    naloxone    [Transfer Hold] nitroglycerin    [Transfer Hold] ondansetron    [Transfer Hold] ondansetron ODT **OR** [Transfer Hold] ondansetron    ondansetron    oxyCODONE    oxyCODONE    [COMPLETED] Insert Peripheral  IV **AND** [Transfer Hold] sodium chloride    [Transfer Hold] sodium chloride    [Transfer Hold] sodium chloride    sodium chloride 30 mL, ropivacaine 0.5 % 30 mL mixture    [Transfer Hold] traMADol     Assessment & Plan   ACUTE RIGHT HIP FRACTURE---ORTHO CONSULTATION--SURGERY PLANNED TODAY AT 730PM AND DONE WITH DR REGALADO; PAIN MANAGEMENT; PT; INPT REHAB AT [facility] OR [facility] PLANNED;CASE MANAGEMENT NOTIFIED;FOOT PUMPS  DOING WELL POST OP AND IS VERY MUCH PLEASED  LOW BP YESTERDAY---THOUGHT TO BE INTRAVASCULARLY DRY AND RESPONDED WELL TO HYDRATION; STABLE  PRIOR LEFT TOTAL HIP REPLACEMENT WITH ANTERIOR APPROACH---DID WELL.   OSTEOPOROSIS---ON PROLIA, VIT D, AND CALCIUM--LOW LEVEL HERE  DYSLIPIDEMIA--NEAR GOAL  ALLERGIC ASTHMA---STABLE  MINNIE---USES CPAP REGULARLY  VEGAN  FULL CODE STATUS  Principal Problem:    Closed right hip fracture          Plan for disposition:TO [facility] OR [facility] HOPEFULLY TOMORROW    Brynn Galicia MD  05/18/24  09:57 EDT

## 2024-05-18 NOTE — PLAN OF CARE
Goal Outcome Evaluation:  Patient's pain managed with po and IV pain meds, external cath in place due to bedrest, IV fluids infusing as ordered, chg bath given, surgery scheduled for this morning

## 2024-05-18 NOTE — PLAN OF CARE
Goal Outcome Evaluation:      Pt is able to make needs known. Pain is managed with medication regimen. Pt is able to ambulate with assist x 2. Dietary called to discuss vegan options with pt. Education is complete, call light is in reach, and no other needs at this time. Continue to monitor.

## 2024-05-18 NOTE — OP NOTE
Operative  Note    Patient: Irene Ospina  YOB: 1955    Medical Record Number: [id number]    Attending Physician: Brynn Galicia MD    Date of Service: 5/18/2024     Pre-op Diagnosis:   Right femoral neck fracture intracapsular    Post-Op Diagnosis Codes:  Right femoral neck fracture intracapsular    PROCEDURE PERFORMED: Right TOTAL HIP ARTHROPLASTY ANTERIOR by utilizing a Direct anterior approach with      Depuy   Emphasis acetabular  Shell Sector with  holes size 50 mm outer diameter   Neutral ALTRX  Polyethylene acetabular  liner- 36 mm internal diameter,   Actis Duo fix Cementless Standard Collar femoral stem size # 6    Delta ceramic femoral head- 36 mm outer diameter, + 1.5 neck length by utilizing a [place] table and image intensifier.     Implant Name Type Inv. Item Serial No.  Lot No. LRB No. Used Action   SUT FW #2 W/TPR NDL 1/2 CIR 38IN 97CM 26.5MM ARPITA - [id number] Implant SUT FW #2 W/TPR NDL 1/2 CIR 38IN 97CM 26.5MM ARPITA  ARTHREX [id number] Right 2 Implanted   LINER ACET EMPHASYS AOX NTRL [id number] - [id number] Implant LINER ACET EMPHASYS AOX NTRL [id number]  DEPUY [id number] Right 1 Implanted   SHLL ACET FEM EMPHASYS 3HL 50MM - [id number] Implant SHLL ACET FEM EMPHASYS 3HL 50MM  DEPUY [id number] Right 1 Implanted   STEM FEM/HIP ACTIS COLR CMTLS STD OFFST 12/14TPR SZ6 - [id number] Implant STEM FEM/HIP ACTIS COLR CMTLS STD OFFST 12/14TPR SZ6  DEPUY SYNTHES [id number] Right 1 Implanted   HD FEM BIOLOXDELTA/ART CERAM 36MM PLS1.5 - [id number] Implant HD FEM BIOLOXDELTA/ART CERAM 36MM PLS1.5  DEPUY [id number] Right 1 Implanted   HEMOST ABS SURGIFOAM  8X12 10MM - [id number] Implant HEMOST ABS SURGIFOAM  8X12 10MM  [employer]  DIV OF MARY AND J [id number] Right 1 Implanted   DEV CONTRL TISS STRATAFIX SPIRAL PDS PLS CT1 2-0 1/2 30CM - [id number] Implant DEV CONTRL TISS STRATAFIX SPIRAL PDS PLS CT1 2-0 1/2 30CM  [employer] ENDO [employer]  DIV OF J AND J [id number] Right 1 Implanted   DEV CONTRL TISS STRATAFIX  SPIRAL MNCRYL UD 3/0 PLS 30CM - [id number] Implant DEV CONTRL TISS STRATAFIX SPIRAL MNCRYL UD 3/0 PLS 30CM  [employer] ENDO SURG[employer]  DIV OF J AND J [id number] Right 1 Implanted       SURGEON: Teri Hansen MD     ASSISTANT: None    ANESTHESIA:  General  Anesthesiologist: Da Bourgeois MD  CRNA: April Morales CRNA     Estimated Blood Loss: 300 mL    Specimens: * No orders in the log *    COMPLICATIONS: Nil.     DRAINS:   [REMOVED] External Urinary Catheter (Removed)   Daily Indications Strict bedrest 05/18/24 0718   Site Assessment Clean;Skin intact 05/18/24 0718   Application/Removal skin care provided 05/18/24 0718   Collection Container Wall suction 05/18/24 0718   Securement Method Securing device 05/18/24 0718   Output (mL) 0 mL 05/18/24 0718       INDICATIONS: The patient is a 68 y.o. female who presented to   [facility] following a history of fall from standing height.  She was evaluated in the emergency room and was diagnosed with a hip fracture.  Secondary to multiple medical comorbidities.  The patient has been admitted to the hospital internist.  As I was on call for the emergency room I was notified regarding the injury and for further evaluation and management of the hip fracture.      The medical history was reviewed. The patient was indicated for a  total hip arthroplasty. Likely risks and benefits of the procedure including, but not limited to infection, DVT, pulmonary embolism, leg length discrepancy, recurrent dislocation, possibility of injury to nerves or vessels, and periprosthetic fractures, Possibility of medical complications including but not limited to stroke, heart attack have been discussed in detail. Despite the risks involved, the patient elected to proceed and informed consent was obtained. The patient was seen in the preoperative holding area, and the  operative site was marked.     She has a previous history of left total hip arthroplasty by   Rikki    DESCRIPTION OF PROCEDURE: The patient has been transferred to [facility] operating Room.   Preoperative antibiotics in the form of  Kefzol was given intravenously prior to the incision.   After achieving adequate  anesthesia, the patient was transferred onto the [place] table. All bony prominences were padded adequately.   The operative hip was prepped and draped in the usual sterile fashion.   Surgical timeout was done. Correct patient, surgical side and site were identified.  Tranexamic acid was given intravenously at the time of skin incision.    A skin incision was made overlying the anterolateral aspect of the  hip for about 10 cm from just below and lateral to the anterior superior iliac spine. Skin and subcutaneous tissue were incised and the deep fascia was incised in line with the skin incision overlying the tensor fascia sukhwinder muscle. The tensor muscle was retracted laterally and interval between the sartorius and the rectus femoris medially and the tensor fascia sukhwinder muscle laterally was developed. The lateral circumflex femoral vessels were identified. They were clamped, cut, and ligated. Unnamed deep fascia was incised. Capsule of the hip joint was identified. Retractors were placed extracapsularly.     The capsule was incised in a L-shaped manner and the anterior capsule was tagged with FiberWire.  Followed by this, the retractors were placed intracapsularly.     There was a hemarthrosis. The femoral neck fracture was identified.  Appropriate capsular releases were done along the inferior and  medial neck and along the saddle of the femoral neck. The femoral neck osteotomy was done distal to the fracture site utilizing an oscillating saw, femoral head was extracted without any difficulty. All bony debris was cleared.     The acetabular cavity was inspected and exposed appropriately by placing retractors taking care to protect the anterior neurovascular structures. . The labrum was  excised, pulvinar was excised from the cotyloid fossa. Acetabular cavity was progressively reamed, maintaining the correct inclination and version under image intensifier control. Adequate medialization was obtained. Adequate fit was found to be obtained at reamer size 50.  The emphasis  acetabular Shell Sector with  holes 50 mm was seated into position. It was found to be seated satisfactorily with adequate inclination and anteversion. There was good stability. Followed by this, a polyethylene liner was seated into position, checked for stability. This was a 36 mm internal diameter,  highly cross linked neutral 0° lip liner.     The periarticular tissue was infiltrated with local anaesthetic injection which consisted of Naropin.     Attention was then directed to the proximal femur. The proximal femur was delivered by utilizing a trochanteric hook placed just distal to the vastus tubercle and proximal to the gluteus graham tendon. The leg was then externally rotated with the gross traction released and  hyperextension, adduction was done using the [place] table spar. The mechanical lift on the table was utilized to support the femur.  Appropriate capsular releases were made to expose the medial slope of the greater trochanter and the proximal femur was delivered. The femoral canal was entered by removing the lateral femoral neck with a box chisel followed by serial broaching. Adequate fit was found to be obtained with a size 6 broach. A trial reduction was performed. Leg lengths and offset were found to be satisfactory under image intensifier on comparison with the opposite hip under image intensifier. Reduction was found to be satisfactory and there was good stability with range of motion of the hip in internal and external rotation. Having been satisfied with the trials, the hip joint was dislocated.     The femoral  trial broach was removed and  Actis Duo fix Cementless  Standard Collar femoral stem size # 6 was  seated into position. This was found to be satisfactorily seated with good stability.      The delta ceramic femoral head 36 mm +1.5 mm neck length was seated onto the trunnion and impacted. Followed by this, the hip joint was reduced. Reduction was found to be satisfactory and image confirmed leg length, offset , implant position and stem fill of the femoral canal.  There were no iatrogenic fractures. Hip joint was thoroughly irrigated with saline. Soft tissue hemostasis was secured. The sponge count and needle count was correct. The FiberWire sutures was tagged to the anterior capsular flaps and they were tied to each other. The incision was closed in layers with vicryl and monocryl sutures and the patient was transferred to the recovery room in a stable condition.     The patient tolerated the procedure well. There were no complications. The patient had adequate distal pulses and good capillary refill.     The patient will be mobilized with physical therapy.   Antibiotic prophylaxis  will be given postoperatively.     The  patient will be started on DVT prophylaxis with  Aspirin 81 mg daily.    I discussed the satisfactory performance of the procedure with the patient's family and discussed with them the postoperative management.    CPT CODE : 56187    Teri Hansen MD     Date: 5/18/2024  Time: 10:47 EDT    cc: Brynn Galicia MD; MD Grayson Mccann Anna M, MD

## 2024-05-18 NOTE — SIGNIFICANT NOTE
05/18/24 0758   OTHER   Discipline physical therapist   Rehab Time/Intention   Session Not Performed other (see comments)  (PT to f/u after sx)   Recommendation   PT - Next Appointment 05/19/24

## 2024-05-19 LAB
ANION GAP SERPL CALCULATED.3IONS-SCNC: 10 MMOL/L (ref 5–15)
BASOPHILS # BLD AUTO: 0.01 10*3/MM3 (ref 0–0.2)
BASOPHILS NFR BLD AUTO: 0.1 % (ref 0–1.5)
BUN SERPL-MCNC: 13 MG/DL (ref 8–23)
BUN/CREAT SERPL: 17.1 (ref 7–25)
CALCIUM SPEC-SCNC: 8.2 MG/DL (ref 8.6–10.5)
CHLORIDE SERPL-SCNC: 102 MMOL/L (ref 98–107)
CO2 SERPL-SCNC: 26 MMOL/L (ref 22–29)
CREAT SERPL-MCNC: 0.76 MG/DL (ref 0.57–1)
DEPRECATED RDW RBC AUTO: 40 FL (ref 37–54)
EGFRCR SERPLBLD CKD-EPI 2021: 84.4 ML/MIN/1.73
EOSINOPHIL # BLD AUTO: 0 10*3/MM3 (ref 0–0.4)
EOSINOPHIL NFR BLD AUTO: 0 % (ref 0.3–6.2)
ERYTHROCYTE [DISTWIDTH] IN BLOOD BY AUTOMATED COUNT: 11.8 % (ref 12.3–15.4)
GLUCOSE SERPL-MCNC: 144 MG/DL (ref 65–99)
HCT VFR BLD AUTO: 29.1 % (ref 34–46.6)
HGB BLD-MCNC: 9.4 G/DL (ref 12–15.9)
IMM GRANULOCYTES # BLD AUTO: 0.09 10*3/MM3 (ref 0–0.05)
IMM GRANULOCYTES NFR BLD AUTO: 0.7 % (ref 0–0.5)
LYMPHOCYTES # BLD AUTO: 0.91 10*3/MM3 (ref 0.7–3.1)
LYMPHOCYTES NFR BLD AUTO: 7.4 % (ref 19.6–45.3)
MCH RBC QN AUTO: 29.8 PG (ref 26.6–33)
MCHC RBC AUTO-ENTMCNC: 32.3 G/DL (ref 31.5–35.7)
MCV RBC AUTO: 92.4 FL (ref 79–97)
MONOCYTES # BLD AUTO: 0.92 10*3/MM3 (ref 0.1–0.9)
MONOCYTES NFR BLD AUTO: 7.5 % (ref 5–12)
NEUTROPHILS NFR BLD AUTO: 10.32 10*3/MM3 (ref 1.7–7)
NEUTROPHILS NFR BLD AUTO: 84.3 % (ref 42.7–76)
NRBC BLD AUTO-RTO: 0 /100 WBC (ref 0–0.2)
PLATELET # BLD AUTO: 181 10*3/MM3 (ref 140–450)
PMV BLD AUTO: 9.6 FL (ref 6–12)
POTASSIUM SERPL-SCNC: 4.4 MMOL/L (ref 3.5–5.2)
RBC # BLD AUTO: 3.15 10*6/MM3 (ref 3.77–5.28)
SODIUM SERPL-SCNC: 138 MMOL/L (ref 136–145)
WBC NRBC COR # BLD AUTO: 12.25 10*3/MM3 (ref 3.4–10.8)

## 2024-05-19 PROCEDURE — 80048 BASIC METABOLIC PNL TOTAL CA: CPT | Performed by: ORTHOPAEDIC SURGERY

## 2024-05-19 PROCEDURE — 25010000002 ONDANSETRON PER 1 MG: Performed by: ORTHOPAEDIC SURGERY

## 2024-05-19 PROCEDURE — 25010000002 NA FERRIC GLUC CPLX PER 12.5 MG: Performed by: FAMILY MEDICINE

## 2024-05-19 PROCEDURE — 25810000003 SODIUM CHLORIDE 0.9 % SOLUTION: Performed by: FAMILY MEDICINE

## 2024-05-19 PROCEDURE — 97162 PT EVAL MOD COMPLEX 30 MIN: CPT

## 2024-05-19 PROCEDURE — 25010000002 CEFAZOLIN PER 500 MG: Performed by: ORTHOPAEDIC SURGERY

## 2024-05-19 PROCEDURE — 97166 OT EVAL MOD COMPLEX 45 MIN: CPT

## 2024-05-19 PROCEDURE — 85025 COMPLETE CBC W/AUTO DIFF WBC: CPT | Performed by: ORTHOPAEDIC SURGERY

## 2024-05-19 RX ORDER — DOCUSATE SODIUM 100 MG/1
100 CAPSULE, LIQUID FILLED ORAL 2 TIMES DAILY
Status: DISCONTINUED | OUTPATIENT
Start: 2024-05-19 | End: 2024-05-21 | Stop reason: HOSPADM

## 2024-05-19 RX ADMIN — DOCUSATE SODIUM 100 MG: 100 CAPSULE, LIQUID FILLED ORAL at 20:29

## 2024-05-19 RX ADMIN — SODIUM CHLORIDE 2000 MG: 900 INJECTION INTRAVENOUS at 04:12

## 2024-05-19 RX ADMIN — DOCUSATE SODIUM 100 MG: 100 CAPSULE, LIQUID FILLED ORAL at 12:56

## 2024-05-19 RX ADMIN — SODIUM CHLORIDE 250 MG: 9 INJECTION, SOLUTION INTRAVENOUS at 12:57

## 2024-05-19 RX ADMIN — Medication 10 ML: at 08:03

## 2024-05-19 RX ADMIN — ESCITALOPRAM OXALATE 10 MG: 10 TABLET ORAL at 08:03

## 2024-05-19 RX ADMIN — DOCUSATE SODIUM 100 MG: 100 CAPSULE, LIQUID FILLED ORAL at 08:03

## 2024-05-19 RX ADMIN — Medication 10 ML: at 20:29

## 2024-05-19 RX ADMIN — ONDANSETRON 4 MG: 2 INJECTION INTRAMUSCULAR; INTRAVENOUS at 13:04

## 2024-05-19 RX ADMIN — HYDROCODONE BITARTRATE AND ACETAMINOPHEN 1 TABLET: 7.5; 325 TABLET ORAL at 22:46

## 2024-05-19 RX ADMIN — HYDROCODONE BITARTRATE AND ACETAMINOPHEN 1 TABLET: 7.5; 325 TABLET ORAL at 05:42

## 2024-05-19 RX ADMIN — MAGNESIUM HYDROXIDE 10 ML: 2400 SUSPENSION ORAL at 12:57

## 2024-05-19 RX ADMIN — HYDROCODONE BITARTRATE AND ACETAMINOPHEN 1 TABLET: 7.5; 325 TABLET ORAL at 12:56

## 2024-05-19 RX ADMIN — SODIUM CHLORIDE 500 ML: 9 INJECTION, SOLUTION INTRAVENOUS at 12:56

## 2024-05-19 NOTE — PROGRESS NOTES
"DATE/TIME OF ADMISSION:  5/16/2024 12:02 PM     LOS: 3 days   Patient Care Team:  Brynn Galicia MD as PCP - General (Family Medicine)  Consults       Date and Time Order Name Status Description    5/16/2024  2:21 PM Ortho (on-call MD unless specified) Completed     5/16/2024  1:20 PM Family Medicine Consult              Subjective A BIT DIZZY WHEN STANDING  ABLE TO WALK WITH A WALKER BUT FEELING WOBBLY WITH THE DIZZINESS  NO CHEST PAIN OR SOA  O2 SATS DO FALL WHEN LAYING DOWN WITH CPAP ON  WHEN I ENTERED THE ROOM, SHE WAS ON 5LPM  SATS 100%  I TURNED O2 DOWN TO 2 LPM AND INFORMED THE NURSE  NURSE KNOWS TO WATCH SATS WHEN LAYING DOWN AND ON CPAP AND TO PIPE IN THE O2 PRN TO KEEP SATS >92%    Interval History: DOING WELL POST OP  WALKING WITH WALKER  ORTHOSTATIC HYPOTENSION---ADDRESSING WITH IRON, IVF FOR NOW  WILL WAIT ON BLOOD FOR NOW    Patient Complaints: DIZZY WHEN STANDING OTHERWISE PAIN IS WELL CONTROLLED  NO BM YET  FOR SURE DOES NOT FEEL COMFORTABLE GOING HOME WITH NO ADULT KIDS IN TOWN AND ONLY HER  TO AIDE HER. I AGREE 100% AND I HAVE REACHED OUT TO [facility]ENT[facility] WHO PLANS ON SEEING HER TODAY  NO BM YET    History taken from: patient    Review of Systems        Objective     Vital Signs  /56   Pulse 78   Temp 97.5 °F (36.4 °C) (Oral)   Resp 20   Ht 160 cm (63\")   Wt 73.2 kg (161 lb 6 oz)   SpO2 98%   BMI 28.59 kg/m²     Physical Exam:     General Appearance:    Alert, cooperative, in no acute distress   Head:    Normocephalic, without obvious abnormality, atraumatic   Eyes:            Lids and lashes normal, conjunctivae and sclerae normal, no   icterus, no pallor, corneas clear, PERRLA   Ears:    Ears appear intact with no abnormalities noted   Throat:   No oral lesions, no thrush, oral mucosa moist   Neck:   No adenopathy, supple, trachea midline, no thyromegaly, no   carotid bruit, no JVD   Lungs:     Clear to auscultation,respirations regular, even and                  " unlabored    Heart:    Regular rhythm and normal rate, normal S1 and S2, no            murmur, no gallop, no rub, no click   Chest Wall:    No abnormalities observed   Abdomen:     Normal bowel sounds, no masses, no organomegaly, soft        non-tender, non-distended, no guarding, no rebound                tenderness   Extremities:   Moves all extremities well, no edema, no cyanosis, no             redness   NO CALF TENDERNESS   Pulses:   Pulses palpable and equal bilaterally   Skin:   No bleeding, bruising or rash   Lymph nodes:   No palpable adenopathy   Neurologic:   Grossly normal, alert and O x 3 SITTING UP IN THE BEDSIDE CHAIR        I HAVE PERSONALLY EXAMINED AND REVIEWED THE PATIENT'S RECORD     Lab Results (last 48 hours)       Procedure Component Value Units Date/Time    Basic Metabolic Panel [[id number]]  (Abnormal) Collected: 05/19/24 0140    Specimen: Blood from Arm, Left Updated: 05/19/24 0228     Glucose 144 mg/dL      BUN 13 mg/dL      Creatinine 0.76 mg/dL      Sodium 138 mmol/L      Potassium 4.4 mmol/L      Chloride 102 mmol/L      CO2 26.0 mmol/L      Calcium 8.2 mg/dL      BUN/Creatinine Ratio 17.1     Anion Gap 10.0 mmol/L      eGFR 84.4 mL/min/1.73     Narrative:      GFR Normal >60  Chronic Kidney Disease <60  Kidney Failure <15      CBC & Differential [[id number]]  (Abnormal) Collected: 05/19/24 0140    Specimen: Blood from Arm, Left Updated: 05/19/24 0147    Narrative:      The following orders were created for panel order CBC & Differential.  Procedure                               Abnormality         Status                     ---------                               -----------         ------                     CBC Auto Differential[[id number]]        Abnormal            Final result                 Please view results for these tests on the individual orders.    CBC Auto Differential [[id number]]  (Abnormal) Collected: 05/19/24 0140    Specimen: Blood from Arm, Left Updated: 05/19/24 0147      WBC 12.25 10*3/mm3      RBC 3.15 10*6/mm3      Hemoglobin 9.4 g/dL      Hematocrit 29.1 %      MCV 92.4 fL      MCH 29.8 pg      MCHC 32.3 g/dL      RDW 11.8 %      RDW-SD 40.0 fl      MPV 9.6 fL      Platelets 181 10*3/mm3      Neutrophil % 84.3 %      Lymphocyte % 7.4 %      Monocyte % 7.5 %      Eosinophil % 0.0 %      Basophil % 0.1 %      Immature Grans % 0.7 %      Neutrophils, Absolute 10.32 10*3/mm3      Lymphocytes, Absolute 0.91 10*3/mm3      Monocytes, Absolute 0.92 10*3/mm3      Eosinophils, Absolute 0.00 10*3/mm3      Basophils, Absolute 0.01 10*3/mm3      Immature Grans, Absolute 0.09 10*3/mm3      nRBC 0.0 /100 WBC     Basic Metabolic Panel [[id number]]  (Abnormal) Collected: 05/18/24 0159    Specimen: Blood from Arm, Right Updated: 05/18/24 0230     Glucose 105 mg/dL      BUN 12 mg/dL      Creatinine 0.73 mg/dL      Sodium 134 mmol/L      Potassium 4.7 mmol/L      Chloride 101 mmol/L      CO2 24.0 mmol/L      Calcium 8.2 mg/dL      BUN/Creatinine Ratio 16.4     Anion Gap 9.0 mmol/L      eGFR 88.6 mL/min/1.73     Narrative:      GFR Normal >60  Chronic Kidney Disease <60  Kidney Failure <15      CBC & Differential [[id number]]  (Abnormal) Collected: 05/18/24 0159    Specimen: Blood from Arm, Right Updated: 05/18/24 0207    Narrative:      The following orders were created for panel order CBC & Differential.  Procedure                               Abnormality         Status                     ---------                               -----------         ------                     CBC Auto Differential[[id number]]        Abnormal            Final result                 Please view results for these tests on the individual orders.    CBC Auto Differential [[id number]]  (Abnormal) Collected: 05/18/24 0159    Specimen: Blood from Arm, Right Updated: 05/18/24 0207     WBC 10.65 10*3/mm3      RBC 3.62 10*6/mm3      Hemoglobin 10.8 g/dL      Hematocrit 33.7 %      MCV 93.1 fL      MCH 29.8 pg      MCHC 32.0  g/dL      RDW 11.9 %      RDW-SD 41.2 fl      MPV 9.4 fL      Platelets 206 10*3/mm3      Neutrophil % 75.0 %      Lymphocyte % 12.8 %      Monocyte % 8.5 %      Eosinophil % 2.8 %      Basophil % 0.4 %      Immature Grans % 0.5 %      Neutrophils, Absolute 7.99 10*3/mm3      Lymphocytes, Absolute 1.36 10*3/mm3      Monocytes, Absolute 0.91 10*3/mm3      Eosinophils, Absolute 0.30 10*3/mm3      Basophils, Absolute 0.04 10*3/mm3      Immature Grans, Absolute 0.05 10*3/mm3      nRBC 0.0 /100 WBC              Imaging Results (Last 48 Hours)       Procedure Component Value Units Date/Time    XR Hip With or Without Pelvis 2 - 3 View Right [[id number]] Collected: 05/18/24 1141     Updated: 05/18/24 1148    Narrative:      XR HIP W OR WO PELVIS 2-3 VIEW RIGHT    Date of Exam: 5/18/2024 11:21 AM EDT    Indication: Post-Op Hip Arthroplasty    Comparison: None available.    Findings: Right hip replacement. Surrounding postoperative changes. Hardware is intact and appropriately situated. Previous left hip replacement. The pubic bones are intact. The sacroiliac joints are normal.      Impression:      Postop changes.      Electronically Signed: Jad Reich MD    5/18/2024 11:46 AM EDT    Workstation ID: [id number]    FL C Arm During Surgery [[id number]] Resulted: 05/18/24 1013     Updated: 05/18/24 1013    Narrative:      This procedure was auto-finalized with no dictation required.    XR Hip With or Without Pelvis 2 - 3 View Right [[id number]] Resulted: 05/18/24 1012     Updated: 05/18/24 1012    Narrative:      This procedure was auto-finalized with no dictation required.    XR Pelvis 1 or 2 View [[id number]] Collected: 05/17/24 1401     Updated: 05/17/24 1405    Narrative:      XR PELVIS 1 OR 2 VW    Date of Exam: 5/17/2024 12:22 PM EDT    Indication: fracture    Comparison: 5/16/2024.    Findings:  AP pelvis was obtained. There is a left total hip replacement. The impacted subcapital right femoral neck fracture is unchanged. The  femoral head remains within the acetabulum.      Impression:      Impression:  Stable appearance of a nondisplaced, impacted right subcapital femoral neck fracture.      Electronically Signed: Ladan Ray MD    5/17/2024 2:02 PM EDT    Workstation ID: [id number]                 I reviewed the patient's new clinical results.    Past Medical History:   Diagnosis Date    Asthma     Hyperlipidemia      Past Surgical History:   Procedure Laterality Date    HIP SURGERY Left          Medication Review:   Scheduled Meds:aspirin, 81 mg, Oral, Daily  docusate sodium, 100 mg, Oral, BID  docusate sodium, 100 mg, Oral, BID  escitalopram, 10 mg, Oral, Daily  ferric gluconate, 250 mg, Intravenous, Daily  magnesium hydroxide, 10 mL, Oral, Daily  sodium chloride, 500 mL, Intravenous, Once  sodium chloride, 10 mL, Intravenous, Q12H      Continuous Infusions:   PRN Meds:.  acetaminophen    aluminum-magnesium hydroxide-simethicone    senna-docusate sodium **AND** polyethylene glycol **AND** bisacodyl **AND** bisacodyl    calcium carbonate    HYDROcodone-acetaminophen    HYDROmorphone **AND** naloxone    Morphine    nitroglycerin    ondansetron    ondansetron ODT **OR** ondansetron    [COMPLETED] Insert Peripheral IV **AND** sodium chloride    sodium chloride    sodium chloride     Assessment & Plan   ACUTE RIGHT HIP FRACTURE POD #1---ORTHO CONSULTATION--SURGERY DONE  YESTERDAY WITH DR REGALADO; PAIN MANAGEMENT; PT; INPT REHAB AT [facility] OR [facility] PLANNED;CASE MANAGEMENT NOTIFIED;FOOT PUMPS  DOING WELL POST OP AND IS VERY MUCH PLEASED  ORTHOSTATIC HYPOTENSION---THOUGHT TO BE INTRAVASCULARLY DRY AND RESPONDED WELL TO HYDRATION; STABLE;  WILL GIVE A 1 LITER BOLUS OF NORMAL SALINE TODAY AND IV IRON REPLACEMENT; FOR NOW, DOES NOT QUALIFY FOR BLOOD TRANSFUSION; RECHECK IN THE AM WITH LABS  PRIOR LEFT TOTAL HIP REPLACEMENT WITH ANTERIOR APPROACH---DID WELL.   OSTEOPOROSIS---ON PROLIA, VIT D, AND CALCIUM--LOW LEVEL HERE; WILL ENCOURAGE  SUPPLEMENTATION  DYSLIPIDEMIA--NEAR GOAL  ALLERGIC ASTHMA---STABLE  MINNIE---USES CPAP REGULARLY  HYPOXIA WITH PAIN MEDS AND LAYING DOWN---O2 PRN PIPED IN TO THE CPAP; SHE DOES NOT REQUIRE THIS AT HOME  ANEMIA DUE TO INTRAOPERATIVE BLOOD LOSS---IV IRON X 2 DOSES; WILL WAIT ON BLOOD AS DOES NOT MEET REQUIREMENTS FOR TRANSFUSION AT THIS TIME  VEGAN  FULL CODE STATUS  Active Problems:    Status post total hip replacement, right          Plan for disposition:TO [facility] HOP[facility] TOMORROW ONCE A BED BECOMES AVAILABLE    Brynn Galicia MD  05/19/24  12:12 EDT

## 2024-05-19 NOTE — PLAN OF CARE
Goal Outcome Evaluation:         Patient is A & O x 4.  The patients pain was treated per the MAR. Patient has been up ambulating to the BSC and back to bed. Patient is able to make her needs known and the call light is with in reach. Will continue with the patients care.

## 2024-05-19 NOTE — THERAPY EVALUATION
Patient Name: Irene Ospina  : 1955    MRN: [id number]                              Today's Date: 2024       Admit Date: 2024    Visit Dx:     ICD-10-CM ICD-9-CM   1. Fall, initial encounter  W19.XXXA E888.9   2. Closed subcapital fracture of right femur, initial encounter  S72.011A 820.09     Patient Active Problem List   Diagnosis    Status post total hip replacement, right     Past Medical History:   Diagnosis Date    Asthma     Hyperlipidemia      Past Surgical History:   Procedure Laterality Date    HIP SURGERY Left       General Information       Row Name 24 1216          OT Time and Intention    Document Type evaluation  -MS     Mode of Treatment occupational therapy  -MS       Row Name 24 1216          General Information    Patient Profile Reviewed yes  -MS     Prior Level of Function independent:;ADL's;driving;all household mobility;community mobility;yard work  -MS     Existing Precautions/Restrictions fall;hip, anterior;oxygen therapy device and L/min  4L O2 currently  -MS     Barriers to Rehab none identified  -MS       Row Name 24 1216          Occupational Profile    Reason for Services/Referral (Occupational Profile) Pt is a 67 y/o F admitted to [facility] 24 with c/o R hip pain s/p fall. Pt POD#1 anterior R CHARISSA 24 by Dr. Hansen. XR R hip indicates impacted subcapital R hip fx. At baseline pt resides with spouse in multi-level home with 4 MACO. Pt typically (I) with ADLs and mobility without AD. Pt is an active , no hx of falls. Pt spouse undergoing chemotherapy, requiring increased assist with ADLs, poor balance.  -MS     Environmental Supports and Barriers (Occupational Profile) limited support, caring for spouse  -MS       Row Name 24 1216          Living Environment    People in Home spouse  -MS       Row Name 24 1216          Home Main Entrance    Number of Stairs, Main Entrance four  -MS     Stair Railings, Main Entrance railings not in  good condition, need repair for safe use  -MS       Row Name 05/19/24 1216          Stairs Within Home, Primary    Number of Stairs, Within Home, Primary other (see comments)  able to reside on main level  -MS       Row Name 05/19/24 1216          Cognition    Orientation Status (Cognition) oriented x 4  -MS       Row Name 05/19/24 1216          Safety Issues, Functional Mobility    Impairments Affecting Function (Mobility) endurance/activity tolerance;pain;strength  -MS               User Key  (r) = Recorded By, (t) = Taken By, (c) = Cosigned By      Initials Name Provider Type    Kathleen Anderson OT Occupational Therapist                     Mobility/ADL's       Row Name 05/19/24 1218          Bed Mobility    Bed Mobility supine-sit  -MS     Supine-Sit [place] (Bed Mobility) minimum assist (75% patient effort)  -MS     Assistive Device (Bed Mobility) bed rails  -MS     Comment, (Bed Mobility) assist with RLE  -MS       Row Name 05/19/24 1218          Transfers    Transfers sit-stand transfer;bed-chair transfer  -MS       Row Name 05/19/24 1218          Bed-Chair Transfer    Bed-Chair [place] (Transfers) minimum assist (75% patient effort)  -MS     Assistive Device (Bed-Chair Transfers) walker, front-wheeled  -MS     Comment, (Bed-Chair Transfer) cues for hand placement  -MS       Row Name 05/19/24 1218          Sit-Stand Transfer    Sit-Stand [place] (Transfers) contact guard  -MS     Assistive Device (Sit-Stand Transfers) walker, front-wheeled  -MS     Comment, (Sit-Stand Transfer) cues for hand placement  -MS               User Key  (r) = Recorded By, (t) = Taken By, (c) = Cosigned By      Initials Name Provider Type    Kathleen Anderson OT Occupational Therapist                   Obj/Interventions       Row Name 05/19/24 1219          Sensory Assessment (Somatosensory)    Sensory Assessment (Somatosensory) sensation intact  -MS       Row Name 05/19/24 1219          Vision  Assessment/Intervention    Visual Impairment/Limitations WNL  -MS       Row Name 05/19/24 1219          Range of Motion Comprehensive    General Range of Motion no range of motion deficits identified  -MS       Row Name 05/19/24 1219          Strength Comprehensive (MMT)    Comment, General Manual Muscle Testing (MMT) Assessment BUE grossly 4-/5  -MS       Row Name 05/19/24 1219          Balance    Balance Assessment sitting static balance;sitting dynamic balance;standing static balance;standing dynamic balance  -MS     Static Sitting Balance supervision  -MS     Dynamic Sitting Balance standby assist  -MS     Position, Sitting Balance unsupported;sitting edge of bed  -MS     Static Standing Balance contact guard  -MS     Dynamic Standing Balance minimal assist  -MS     Position/Device Used, Standing Balance supported;walker, front-wheeled  -MS               User Key  (r) = Recorded By, (t) = Taken By, (c) = Cosigned By      Initials Name Provider Type    Kathleen Anderson, MIRI Occupational Therapist                   Goals/Plan       Row Name 05/19/24 1613          Bed Mobility Goal 1 (OT)    Activity/Assistive Device (Bed Mobility Goal 1, OT) bed mobility activities, all  -MS     [place] Level/Cues Needed (Bed Mobility Goal 1, OT) modified independence  -MS     Time Frame (Bed Mobility Goal 1, OT) long term goal (LTG);2 weeks  -MS     Progress/Outcomes (Bed Mobility Goal 1, OT) new goal  -MS       Row Name 05/19/24 1613          Transfer Goal 1 (OT)    Activity/Assistive Device (Transfer Goal 1, OT) transfers, all  -MS     [place] Level/Cues Needed (Transfer Goal 1, OT) modified independence  -MS     Time Frame (Transfer Goal 1, OT) long term goal (LTG);2 weeks  -MS     Progress/Outcome (Transfer Goal 1, OT) new goal  -MS       Row Name 05/19/24 1613          Dressing Goal 1 (OT)    Activity/Device (Dressing Goal 1, OT) lower body dressing  -MS     [place]/Cues Needed (Dressing Goal 1, OT) minimum  assist (75% or more patient effort)  -MS     Time Frame (Dressing Goal 1, OT) long term goal (LTG);2 weeks  -MS     Progress/Outcome (Dressing Goal 1, OT) new goal  -MS       Row Name 05/19/24 1613          Toileting Goal 1 (OT)    Activity/Device (Toileting Goal 1, OT) toileting skills, all  -MS     [place] Level/Cues Needed (Toileting Goal 1, OT) minimum assist (75% or more patient effort)  -MS     Time Frame (Toileting Goal 1, OT) long term goal (LTG);2 weeks  -MS     Progress/Outcome (Toileting Goal 1, OT) new goal  -MS       Row Name 05/19/24 1613          Therapy Assessment/Plan (OT)    Planned Therapy Interventions (OT) activity tolerance training;adaptive equipment training;BADL retraining;functional balance retraining;IADL retraining;occupation/activity based interventions;passive ROM/stretching;patient/caregiver education/training;transfer/mobility retraining;strengthening exercise;ROM/therapeutic exercise  -MS               User Key  (r) = Recorded By, (t) = Taken By, (c) = Cosigned By      Initials Name Provider Type    MS Kathleen Mckoy, OT Occupational Therapist                   Clinical Impression       Row Name 05/19/24 1220          Pain Assessment    Pretreatment Pain Rating 2/10  -MS     Posttreatment Pain Rating 4/10  -MS     Pain Location - Side/Orientation Right  -MS     Pain Location lower  -MS     Pain Location - hip  -MS     Pain Intervention(s) Repositioned;Emotional support  -MS       Row Name 05/19/24 1220          Plan of Care Review    Plan of Care Reviewed With patient  -MS     Progress no change  -MS     Outcome Evaluation Pt is a 67 y/o F admitted to [facility] 5/16/24 with c/o R hip pain s/p fall. Pt POD#1 anterior R CHARISSA 5/17/24 by Dr. Hansen. At baseline pt resides with spouse in multi-level home with 4 MACO. Pt typically (I) with ADLs and mobility without AD. Pt is an active , no hx of falls. Pt spouse undergoing chemotherapy, requiring increased assist with ADLs, poor  balance. This date pt A&Ox4 on 4L O2 and c/o 2/10 pain at rest, 4/10 pain with activity. Pt titrated to RA with mobility, desaturated to 86%, returned to 4L O2 post activity, increased to 98%. Pt requires min A with bed mobility with RLE to edge of bed, requires CGA with RW to come to standing and min A with RW to tranfser from be to chair. Pt hypotensive with mobility this date, unable to progress further with mobility. Pt is at high risk for falls, will require increased assist with ADLs/mobility and does not appear safe to dc home at this time. Pt likely to progress well, however will require acute IP rehab when medically appropriate for dc, will follow while admitted.  -MS       Row Name 05/19/24 1220          Therapy Assessment/Plan (OT)    Rehab Potential (OT) good, to achieve stated therapy goals  -MS     Criteria for Skilled Therapeutic Interventions Met (OT) yes;meets criteria;skilled treatment is necessary  -MS     Therapy Frequency (OT) 5 times/wk  -MS     Predicted Duration of Therapy Intervention (OT) until d/c  -MS       Row Name 05/19/24 1220          Therapy Plan Review/Discharge Plan (OT)    Anticipated Discharge Disposition (OT) inpatient rehabilitation facility  -MS       Row Name 05/19/24 1220          Vital Signs    Pre Systolic BP Rehab 98  -MS     Pre Treatment Diastolic BP 43  -MS     Intra Systolic BP Rehab 96  -MS     Intra Treatment Diastolic BP 42  -MS     Post Systolic BP Rehab 109  -MS     Post Treatment Diastolic BP 50  -MS     Pretreatment Heart Rate (beats/min) 83  -MS     Posttreatment Heart Rate (beats/min) 90  -MS     Pre SpO2 (%) 97  -MS     O2 Delivery Pre Treatment nasal cannula  4L  -MS     Intra SpO2 (%) 90  -MS     O2 Delivery Intra Treatment room air  -MS     Post SpO2 (%) 98  -MS     O2 Delivery Post Treatment nasal cannula  4L  -MS     Pre Patient Position Supine  -MS     Intra Patient Position Standing  -MS     Post Patient Position Sitting  -MS       Row Name 05/19/24  1220          Positioning and Restraints    Pre-Treatment Position in bed  -MS     Post Treatment Position chair  -MS     In Chair notified nsg;reclined;call light within reach;exit alarm on;encouraged to call for assist;legs elevated  -MS               User Key  (r) = Recorded By, (t) = Taken By, (c) = Cosigned By      Initials Name Provider Type    Kathleen Anderson OT Occupational Therapist                   Outcome Measures       Row Name 05/19/24 1613          How much help from another is currently needed...    Putting on and taking off regular lower body clothing? 2  -MS     Bathing (including washing, rinsing, and drying) 2  -MS     Toileting (which includes using toilet bed pan or urinal) 2  -MS     Putting on and taking off regular upper body clothing 4  -MS     Taking care of personal grooming (such as brushing teeth) 4  -MS     Eating meals 4  -MS     AM-PAC 6 Clicks Score (OT) 18  -MS       Row Name 05/19/24 0809          How much help from another person do you currently need...    Turning from your back to your side while in flat bed without using bedrails? 3  -MSA     Moving from lying on back to sitting on the side of a flat bed without bedrails? 3  -MSA     Moving to and from a bed to a chair (including a wheelchair)? 3  -MSA     Standing up from a chair using your arms (e.g., wheelchair, bedside chair)? 2  -MSA     Climbing 3-5 steps with a railing? 1  -MSA     To walk in hospital room? 2  -MSA     AM-PAC 6 Clicks Score (PT) 14  -MSA     Highest Level of Mobility Goal 4 --> Transfer to chair/commode  -MSA       Row Name 05/19/24 1613          Functional Assessment    Outcome Measure Options AM-PAC 6 Clicks Daily Activity (OT)  -MS               User Key  (r) = Recorded By, (t) = Taken By, (c) = Cosigned By      Initials Name Provider Type    Kathleen Anderson OT Occupational Therapist    Anneliese Neal LPN Licensed Nurse                    Occupational Therapy Education       Title: PT OT  SLP Therapies (Done)       Topic: Occupational Therapy (Done)       Point: ADL training (Done)       Description:   Instruct learner(s) on proper safety adaptation and remediation techniques during self care or transfers.   Instruct in proper use of assistive devices.                  Learning Progress Summary             Patient Acceptance, E,TB, VU by MS at 5/19/2024 1614                         Point: Precautions (Done)       Description:   Instruct learner(s) on prescribed precautions during self-care and functional transfers.                  Learning Progress Summary             Patient Acceptance, E,TB, VU by MS at 5/19/2024 1614                         Point: Body mechanics (Done)       Description:   Instruct learner(s) on proper positioning and spine alignment during self-care, functional mobility activities and/or exercises.                  Learning Progress Summary             Patient Acceptance, E,TB, VU by MS at 5/19/2024 1614                                         User Key       Initials Effective Dates Name Provider Type Discipline    MS 07/13/22 -  Kathleen Mckoy OT Occupational Therapist OT                  OT Recommendation and Plan  Planned Therapy Interventions (OT): activity tolerance training, adaptive equipment training, BADL retraining, functional balance retraining, IADL retraining, occupation/activity based interventions, passive ROM/stretching, patient/caregiver education/training, transfer/mobility retraining, strengthening exercise, ROM/therapeutic exercise  Therapy Frequency (OT): 5 times/wk  Plan of Care Review  Plan of Care Reviewed With: patient  Progress: no change  Outcome Evaluation: Pt is a 69 y/o F admitted to [facility] 5/16/24 with c/o R hip pain s/p fall. Pt POD#1 anterior R CHARISSA 5/17/24 by Dr. Hansen. At baseline pt resides with spouse in multi-level home with 4 MACO. Pt typically (I) with ADLs and mobility without AD. Pt is an active , no hx of falls. Pt spouse  undergoing chemotherapy, requiring increased assist with ADLs, poor balance. This date pt A&Ox4 on 4L O2 and c/o 2/10 pain at rest, 4/10 pain with activity. Pt titrated to RA with mobility, desaturated to 86%, returned to 4L O2 post activity, increased to 98%. Pt requires min A with bed mobility with RLE to edge of bed, requires CGA with RW to come to standing and min A with RW to tranfser from be to chair. Pt hypotensive with mobility this date, unable to progress further with mobility. Pt is at high risk for falls, will require increased assist with ADLs/mobility and does not appear safe to dc home at this time. Pt likely to progress well, however will require acute IP rehab when medically appropriate for dc, will follow while admitted.     Time Calculation:                   Kathleen Mckoy OT  5/19/2024

## 2024-05-19 NOTE — PLAN OF CARE
Goal Outcome Evaluation:  Plan of Care Reviewed With: patient        Progress: no change  Outcome Evaluation: Pt is a 67 y/o F admitted to [facility] 5/16/24 with c/o R hip pain s/p fall. Pt POD#1 anterior R CHARISSA 5/17/24 by Dr. Hansen. At baseline pt resides with spouse in multi-level home with 4 MACO. Pt typically (I) with ADLs and mobility without AD. Pt is an active , no hx of falls. Pt spouse undergoing chemotherapy, requiring increased assist with ADLs, poor balance. This date pt A&Ox4 on 4L O2 and c/o 2/10 pain at rest, 4/10 pain with activity. Pt titrated to RA with mobility, desaturated to 86%, returned to 4L O2 post activity, increased to 98%. Pt requires min A with bed mobility with RLE to edge of bed, requires CGA with RW to come to standing and min A with RW to tranfser from be to chair. Pt hypotensive with mobility this date, unable to progress further with mobility. Pt is at high risk for falls, will require increased assist with ADLs/mobility and does not appear safe to dc home at this time. Pt likely to progress well, however will require acute IP rehab when medically appropriate for dc, will follow while admitted.      Anticipated Discharge Disposition (OT): inpatient rehabilitation facility

## 2024-05-19 NOTE — PLAN OF CARE
Goal Outcome Evaluation:         Pt is able to make needs known. Pain is managed with medication regimen. Pt is able to ambulate with assist x 1 to chair. Education is complete, call light is in reach, and no other needs at this time. Continue to monitor.

## 2024-05-19 NOTE — PLAN OF CARE
Goal Outcome Evaluation:  Plan of Care Reviewed With: patient           Outcome Evaluation: Pt is a 69 YO F POD 1 R ant CHARISSA, following fall at home with frx. Pt reports living at home with spouse. Pt has 4 MACO and is generaly very independent with all ADLs, ambluation wihtout AD and no other recent falls. This fall happened while gardening. Pt reports spouse is undergoing cancer treatment and is unable to assist with ADLs if needed. Pt this date demonstrates fair mobility, requiring MIN A for bed mobility, and MIN A to tranfser from bed to bedside chair. Pt limited to transfers this date due to hypotension. Pt is below baseline and does not appear safe for return home at this time, recommendation is acute IP rehab at d/c.      Anticipated Discharge Disposition (PT): inpatient rehabilitation facility

## 2024-05-19 NOTE — PROGRESS NOTES
"      Patient: Irene Ospina  YOB: 1955     Date of Admission: 5/16/2024 12:02 PM Medical Record Number: [id number]     Attending Physician: Brynn Galicia MD    Procedure(s):  TOTAL HIP ARTHROPLASTY ANTERIOR Post Operative Day Number: 1    Subjective : No new orthopaedic complaints     Pain Relief: some relief with present medication.     Systemic Complaints: No Complaints  Vitals:    05/19/24 0042 05/19/24 0422 05/19/24 0441 05/19/24 0703   BP: 98/60  99/59 111/56   BP Location: Right arm  Right arm    Patient Position: Lying  Lying    Pulse: 80  85 78   Resp: 18  20 20   Temp: 97.7 °F (36.5 °C)  98.6 °F (37 °C) 97.5 °F (36.4 °C)   TempSrc: Oral  Oral Oral   SpO2: 96%  100% 98%   Weight:  73.2 kg (161 lb 6 oz)     Height:           Physical Exam: 68 y.o. female    General Appearance:       Alert, cooperative, in no acute distress                  Extremities:    Dressing Clean, Dry and Intact         Incision healthy without signs or symptoms of infections         No clinical sign of DVT        Able to do good movements of digits    Pulses:   Pulses palpable and equal bilaterally           Diagnostic Tests:     Results from last 7 days   Lab Units 05/19/24  0140 05/18/24  0159 05/17/24 0347   WBC 10*3/mm3 12.25* 10.65 11.09*   HEMOGLOBIN g/dL 9.4* 10.8* 11.5*   HEMATOCRIT % 29.1* 33.7* 35.0   PLATELETS 10*3/mm3 181 206 222     Results from last 7 days   Lab Units 05/19/24  0140 05/18/24  0159 05/17/24  0347   SODIUM mmol/L 138 134* 136   POTASSIUM mmol/L 4.4 4.7 4.6   CHLORIDE mmol/L 102 101 103   CO2 mmol/L 26.0 24.0 22.0   BUN mg/dL 13 12 15   CREATININE mg/dL 0.76 0.73 0.88   GLUCOSE mg/dL 144* 105* 125*   CALCIUM mg/dL 8.2* 8.2* 8.5*     Results from last 7 days   Lab Units 05/17/24  0347 05/16/24  1359   INR  0.98 0.94   APTT seconds 26.1 21.7*     No results found for: \"CRP\"  No results found for: \"SEDRATE\"  No results found for: \"URICACID\"  No results found for: \"CRYSTAL\"  Microbiology " Results (last 10 days)       ** No results found for the last 240 hours. **          XR Hip With or Without Pelvis 2 - 3 View Right    Result Date: 5/18/2024  Postop changes. Electronically Signed: Jad Reich MD  5/18/2024 11:46 AM EDT  Workstation ID: [id number]    XR Pelvis 1 or 2 View    Result Date: 5/17/2024  Impression: Stable appearance of a nondisplaced, impacted right subcapital femoral neck fracture. Electronically Signed: Ladan Ray MD  5/17/2024 2:02 PM EDT  Workstation ID: [id number]    XR Chest 1 View    Result Date: 5/16/2024  Impression: 1.No acute radiographic abnormality is identified. Electronically Signed: Jm Tovar MD  5/16/2024 4:03 PM EDT  Workstation ID: [id number]    XR Hip With or Without Pelvis 2 - 3 View Right    Result Date: 5/16/2024  Impression: Impacted subcapital right hip fracture. Electronically Signed: Lidya Arreola MD  5/16/2024 1:08 PM EDT  Workstation ID: [id number]           Current Medications:  Scheduled Meds:aspirin, 81 mg, Oral, Daily  docusate sodium, 100 mg, Oral, BID  escitalopram, 10 mg, Oral, Daily  sodium chloride, 10 mL, Intravenous, Q12H      Continuous Infusions:   PRN Meds:.  acetaminophen    aluminum-magnesium hydroxide-simethicone    senna-docusate sodium **AND** polyethylene glycol **AND** bisacodyl **AND** bisacodyl    calcium carbonate    HYDROcodone-acetaminophen    HYDROmorphone **AND** naloxone    Morphine    nitroglycerin    ondansetron    ondansetron ODT **OR** ondansetron    [COMPLETED] Insert Peripheral IV **AND** sodium chloride    sodium chloride    sodium chloride    Assessment:    Procedure(s):  TOTAL HIP ARTHROPLASTY ANTERIOR      Status post total hip replacement, right      PLAN:   Continues current post-op course  Anticoagulation: Aspirin started  Mobilize with PT as tolerated per protocol    Weight Bearing: WBAT  Discharge Plan: OK to plan for discharge in  today / tomorrow [facility]      Teri Hansen MD    Date: 5/19/2024    Time: 10:31 EDT

## 2024-05-19 NOTE — DISCHARGE INSTRUCTIONS
Discharge and Follow up Instructions:      Hip Fracture Discharge Instructions:    I. ACTIVITIES:  1. Exercises:  Complete exercise program as taught by the hospital physical therapist 2 times per day  Exercise program will be advanced by the physical therapist  During the day be up ambulating every 2 hours (while awake) for short distances  Complete the ankle pump exercises at least 10 times per hour (while awake)  Elevate legs when in bed and for at least 30 minutes during the day.Use cold packs 20-30 minutes approximately 5 times per day. This should be done before and after completing your exercises and at any time you are experiencing pain/ stiffness in your operative extremity.      2. Activities of Daily Living:  No tub baths, hot tubs, or swimming pools for 4 weeks  May shower and let water run over the incision on post-operative day #5 if no drainage. Do not scrub or rub the incision. Simply let the water run over the incision and pat dry.    II. Restrictions  Follow anterior hip dislocation precautions   your surgeon will discuss with you when you will be able to drive again. Usual guidelines are you are to be off pain medications prior to driving.  Weight bearing is WBAT  First week stay inside on even terrain. May go up and down stairs one stair at a time utilizing the hand rail once cleared by physical therapy to do so.  After one week, you may venture outside (if cleared to do so by physical therapist).    III. Precautions:  Everyone that comes near you should wash their hands  No elective dental, genital-urinary, or colon procedures or surgical procedures for 12 weeks after surgery unless absolutely necessary.   If dental work or surgical procedure is deemed absolutely necessary, you will need to contact your surgeon as you will need to take antibiotics 1 hour prior to any dental work (including teeth cleanings).  Please discuss with your surgeon prophylactic antibiotics as the length of time this  intervention will be necessary for you varies with each patient’s health history and situation.  Avoid sick people. If you must be around someone who is ill, they should wear a mask.  Avoid visits to the Emergency Room or Urgent Care. If you feel you need to go to the Emergency Room, please notify your Surgeon's office.  Stockings are to be worn for one week after surgery and are to be placed on in the morning and removed at night. Observe your skin when stocking is removed for any problems. Monitor the stockings to ensure that any swelling is not causing the stockings to become too tight. In this case, remove stockings immediately.      IV. INCISION CARE:  Wash your hands prior to dressing changes  Change the dressing as needed to keep incision clean and dry. Utilize dry gauze and paper tape. Avoid touching the side of the gauze that goes against the incision with your hands.  No creams or ointments to the incision  May remove dressing once the incision is free of drainage  Do not touch or pick at the incision  Check incision every day and notify surgeon immediately if any of the following signs or symptoms are noted:  Increase in redness  Increase in swelling around the incision and of the entire extremity  Increase in pain  Drainage oozing from the incision  Pulling apart of the edges of the incision  Increase in overall body temperature (greater than 100.5 degrees)    OK to remove the island dressing in one week. You have absorbable sutures with steristrips/exofin fusion deep to the island,     please do not remove the steri strips/ exofin fusion for 14 days, you can shower on them 6 days after surgery.    V. Medications:   1. Anticoagulants: You will be discharged on an anticoagulant. This is a prophylactic medication that helps prevent blood clots during your post-operative period.  You will be on aspirin for 30 days.   While taking the anticoagulant, you should avoid taking any additional aspirin, ibuprofen  (Advil or Motrin), Aleve (Naprosyn) or other non-steroidal anti-inflammatory medications.   Notify surgeon immediately if any michael bleeding is noted in the urine, stool, emesis, or from the nose or the incision. Blood in the stool will often appear as black rather than red. Blood in urine may appear as pink. Blood in emesis may appear as brown/black like coffee grounds.  You will need to apply pressure for longer periods of time to any cuts or abrasions to stop bleeding  Avoid alcohol while taking anticoagulants    2. Stool Softeners: You will be at greater risk of constipation after surgery due to being less mobile and the pain medications.   Take stool softeners as instructed by your surgeon while on pain medications. Over the counter Colace 100 mg 1-2 capsules twice daily.   If stools become too loose or too frequent, please decreases the dosage or stop the stool softener.  If constipation occurs despite use of stool softeners, you are to continue the stool softeners and add a laxative (Milk of Magnesia 1 ounce daily as needed).  Dulcolax oral tabs or suppository, or a fleets enema can also be utilized for constipation and can be obtained over the counter.   If above interventions are unsuccessful in inducing bowel movements, please contact your surgeon's office / family physician's office.  Drink plenty of fluids, and eat fruits and vegetables during your recovery time    3. Pain Medications utilized after surgery are narcotics and the law requires that the following information be given to all patients that are prescribed narcotics:  CLASSIFICATION: Pain medications are called Opioids and are narcotics  LEGALITIES: It is illegal to share narcotics with others and to drive within 24 hours of taking narcotics  POTENTIAL SIDE EFFECTS: Potential side effects of opioids include: nausea, vomiting, itching, dizziness, drowsiness, dry mouth, constipation, and difficulty urinating.  POTENTIAL ADVERSE EFFECTS:   Opioid  tolerance can develop with use of pain medications and this simply means that it requires more and more of the medication to control pain; however, this is seen more in patients that use opioids for longer periods of time.  Opioid dependence can develop with use of Opioids and this simply means that to stop the medication can cause withdrawal symptoms; however, this is seen with patients that use Opioids for longer periods of time.  Opioid addiction can develop with use of Opioids and the incidence of this is very unlikely in patients who take the medications as ordered and stop the medications as instructed.  Opioid overdose can be dangerous, but is unlikely when the medication is taken as ordered and stopped when ordered. It is important not to mix opioids with alcohol or with and type of sedative such as Benadryl as this can lead to over sedation and respiratory difficulty.  DOSAGE:   Pain medications will need to be taken consistently for the first week to decrease pain and promote adequate pain relief and participation in physical therapy.  After the initial surgical pain begins to resolve, you may begin to decrease the pain medication. By the end of 6 weeks, you should be off of pain medications.  Refills will not be given by the office during evening hours, on weekends, or after 6 weeks post-op.  To seek refills on pain medications during the initial 6 week post-operative period, you must call the office 48 hours in advance to request the refill. The office will then notify you when to  the prescription. DO NOT wait until you are out of the medication to request a refill.    V. FOLLOW-UP VISITS:  You will need to follow up in the office with your surgeon on June 5, 2024. Please call this number [phone]  to schedule this appointment.  If you have any concerns or suspected complications prior to your follow up visit, please call your surgeons office. Do not wait until your appointment time if you  suspect complications. These will need to be addressed in the office promptly.

## 2024-05-19 NOTE — DISCHARGE PLACEMENT REQUEST
"Hamida Llanos (68 y.o. Female)       Date of Birth   1955    Social Security Number       Address   [address] [place] [address]    Home Phone   [phone]    MRN   [id number]       Jehovah's witness   Bahai    Marital Status                               Admission Date   5/16/24    Admission Type   Emergency    Admitting Provider   Brynn Galicia MD    Attending Provider   Brynn Galicia MD    Department, Room/Bed   [facility] SURGICAL INPATIENT, 4102/1       Discharge Date       Discharge Disposition       Discharge Destination                                 Attending Provider: Brynn Galicia MD    Allergies: Ketorolac Tromethamine, Codeine    Isolation: None   Infection: None   Code Status: CPR    Ht: 160 cm (63\")   Wt: 73.2 kg (161 lb 6 oz)    Admission Cmt: None   Principal Problem: Closed right hip fracture [S72.001A]                   Active Insurance as of 5/16/2024       Primary Coverage       Payor Plan Insurance Group Employer/Plan Group    MEDICARE MEDICARE A & B        Payor Plan Address Payor Plan Phone Number Payor Plan Fax Number Effective Dates    [address] [phone]  7/1/2018 - None Entered    [facility] [id number]         Subscriber Name Subscriber Birth Date Member ID       HAMIDA LLANOS 1955 [id number]               Secondary Coverage       Payor Plan Insurance Group Employer/Plan Group    [employer] [place] [employer] [place]        Payor Plan Address Payor Plan Phone Number Payor Plan Fax Number Effective Dates    [address] [employer] [phone]  1/1/2019 - None Entered    [facility] [id number]         Subscriber Name Subscriber Birth Date Member ID       HAMIDA LLANOS 1955 [id number]                     Emergency Contacts        (Rel.) Home Phone Work Phone Mobile Phone    JOHN SERRANO (Spouse) [phone] -- [phone]    CristianSavi (Daughter) -- -- [phone]                 Physician Progress Notes (last 48 hours)    " "Teri Hansen MD at 05/19/24 1031                Patient: Irene Ospina  YOB: 1955     Date of Admission: 5/16/2024 12:02 PM Medical Record Number: [id number]     Attending Physician: Brynn Galicia MD    Procedure(s):  TOTAL HIP ARTHROPLASTY ANTERIOR Post Operative Day Number: 1    Subjective : No new orthopaedic complaints     Pain Relief: some relief with present medication.     Systemic Complaints: No Complaints  Vitals:    05/19/24 0042 05/19/24 0422 05/19/24 0441 05/19/24 0703   BP: 98/60  99/59 111/56   BP Location: Right arm  Right arm    Patient Position: Lying  Lying    Pulse: 80  85 78   Resp: 18  20 20   Temp: 97.7 °F (36.5 °C)  98.6 °F (37 °C) 97.5 °F (36.4 °C)   TempSrc: Oral  Oral Oral   SpO2: 96%  100% 98%   Weight:  73.2 kg (161 lb 6 oz)     Height:           Physical Exam: 68 y.o. female    General Appearance:       Alert, cooperative, in no acute distress                  Extremities:    Dressing Clean, Dry and Intact         Incision healthy without signs or symptoms of infections         No clinical sign of DVT        Able to do good movements of digits    Pulses:   Pulses palpable and equal bilaterally           Diagnostic Tests:     Results from last 7 days   Lab Units 05/19/24  0140 05/18/24  0159 05/17/24  0347   WBC 10*3/mm3 12.25* 10.65 11.09*   HEMOGLOBIN g/dL 9.4* 10.8* 11.5*   HEMATOCRIT % 29.1* 33.7* 35.0   PLATELETS 10*3/mm3 181 206 222     Results from last 7 days   Lab Units 05/19/24  0140 05/18/24  0159 05/17/24  0347   SODIUM mmol/L 138 134* 136   POTASSIUM mmol/L 4.4 4.7 4.6   CHLORIDE mmol/L 102 101 103   CO2 mmol/L 26.0 24.0 22.0   BUN mg/dL 13 12 15   CREATININE mg/dL 0.76 0.73 0.88   GLUCOSE mg/dL 144* 105* 125*   CALCIUM mg/dL 8.2* 8.2* 8.5*     Results from last 7 days   Lab Units 05/17/24  0347 05/16/24  1359   INR  0.98 0.94   APTT seconds 26.1 21.7*     No results found for: \"CRP\"  No results found for: \"SEDRATE\"  No results found for: " "\"URICACID\"  No results found for: \"CRYSTAL\"  Microbiology Results (last 10 days)       ** No results found for the last 240 hours. **          XR Hip With or Without Pelvis 2 - 3 View Right    Result Date: 5/18/2024  Postop changes. Electronically Signed: Jad Reich MD  5/18/2024 11:46 AM EDT  Workstation ID: [id number]    XR Pelvis 1 or 2 View    Result Date: 5/17/2024  Impression: Stable appearance of a nondisplaced, impacted right subcapital femoral neck fracture. Electronically Signed: Ladan Ray MD  5/17/2024 2:02 PM EDT  Workstation ID: [id number]    XR Chest 1 View    Result Date: 5/16/2024  Impression: 1.No acute radiographic abnormality is identified. Electronically Signed: Jm Tovar MD  5/16/2024 4:03 PM EDT  Workstation ID: [id number]    XR Hip With or Without Pelvis 2 - 3 View Right    Result Date: 5/16/2024  Impression: Impacted subcapital right hip fracture. Electronically Signed: Lidya Arreola MD  5/16/2024 1:08 PM EDT  Workstation ID: [id number]           Current Medications:  Scheduled Meds:aspirin, 81 mg, Oral, Daily  docusate sodium, 100 mg, Oral, BID  escitalopram, 10 mg, Oral, Daily  sodium chloride, 10 mL, Intravenous, Q12H      Continuous Infusions:   PRN Meds:.  acetaminophen    aluminum-magnesium hydroxide-simethicone    senna-docusate sodium **AND** polyethylene glycol **AND** bisacodyl **AND** bisacodyl    calcium carbonate    HYDROcodone-acetaminophen    HYDROmorphone **AND** naloxone    Morphine    nitroglycerin    ondansetron    ondansetron ODT **OR** ondansetron    [COMPLETED] Insert Peripheral IV **AND** sodium chloride    sodium chloride    sodium chloride    Assessment:    Procedure(s):  TOTAL HIP ARTHROPLASTY ANTERIOR      Status post total hip replacement, right      PLAN:   Continues current post-op course  Anticoagulation: Aspirin started  Mobilize with PT as tolerated per protocol    Weight Bearing: WBAT  Discharge Plan: OK to plan for discharge in  today / tomorrow to " "home and home health  from orthopadic perspective.      Teri Hansen MD    Date: 5/19/2024    Time: 10:31 EDT              Electronically signed by Teri Hansen MD at 05/19/24 1032       Brynn Galicia MD at 05/18/24 0957          DATE/TIME OF ADMISSION:  5/16/2024 12:02 PM     LOS: 2 days   Patient Care Team:  Brynn Galicia MD as PCP - General (Family Medicine)  Consults       Date and Time Order Name Status Description    5/16/2024  2:21 PM Ortho (on-call MD unless specified)      5/16/2024  1:20 PM Family Medicine Consult              Subjective SURGERY THIS AM    Interval History: S/P RIGHT HIP FRACTURE    Patient Complaints: PAIN CONTROLLED PRE OP TODAY AND IS VERY COMFORTABLE POST OP  SHE HAS WALKED TO USE THE BEDSIDE COMMMODE  DOING WELL  MILD NAUSEA POST OP HAS RESOLVED    History taken from: patient    Review of Systems        Objective     Vital Signs  /53 (BP Location: Left arm, Patient Position: Lying)   Pulse 67   Temp 99.7 °F (37.6 °C) (Oral)   Resp 15   Ht 160 cm (63\")   Wt 73.1 kg (161 lb 2.5 oz)   SpO2 93%   BMI 28.55 kg/m²     Physical Exam:      General Appearance:    Alert, cooperative, in no acute distress   Head:    Normocephalic, without obvious abnormality, atraumatic   Eyes:            Lids and lashes normal, conjunctivae and sclerae normal, no   icterus, no pallor, corneas clear, PERRLA   Ears:    Ears appear intact with no abnormalities noted   Throat:   No oral lesions, no thrush, oral mucosa moist   Neck:   No adenopathy, supple, trachea midline, no thyromegaly, no   carotid bruit, no JVD   Lungs:     Clear to auscultation,respirations regular, even and                  unlabored    Heart:    Regular rhythm and normal rate, normal S1 and S2, no            murmur, no gallop, no rub, no click   Chest Wall:    No abnormalities observed; PECTUS EXCAVATUM+   Abdomen:     Normal bowel sounds, no masses, no organomegaly, soft        non-tender, " non-distended, no guarding, no rebound                tenderness   Extremities:   Moves all extremities well, no edema, no cyanosis, no             redness; RIGHT ANTERIOR IP DRESSED---DRY  NO CALF TENDERNESS   Pulses:   Pulses palpable and equal bilaterally   Skin:   No bleeding, bruising or rash   Lymph nodes:   No palpable adenopathy   Neurologic:   Grossly normal, alert and O x 3        I HAVE PERSONALLY EXAMINED AND REVIEWED THE PATIENT'S RECORD     Lab Results (last 48 hours)       Procedure Component Value Units Date/Time    Basic Metabolic Panel [[id number]]  (Abnormal) Collected: 05/18/24 0159    Specimen: Blood from Arm, Right Updated: 05/18/24 0230     Glucose 105 mg/dL      BUN 12 mg/dL      Creatinine 0.73 mg/dL      Sodium 134 mmol/L      Potassium 4.7 mmol/L      Chloride 101 mmol/L      CO2 24.0 mmol/L      Calcium 8.2 mg/dL      BUN/Creatinine Ratio 16.4     Anion Gap 9.0 mmol/L      eGFR 88.6 mL/min/1.73     Narrative:      GFR Normal >60  Chronic Kidney Disease <60  Kidney Failure <15      CBC & Differential [[id number]]  (Abnormal) Collected: 05/18/24 0159    Specimen: Blood from Arm, Right Updated: 05/18/24 0207    Narrative:      The following orders were created for panel order CBC & Differential.  Procedure                               Abnormality         Status                     ---------                               -----------         ------                     CBC Auto Differential[[id number]]        Abnormal            Final result                 Please view results for these tests on the individual orders.    CBC Auto Differential [[id number]]  (Abnormal) Collected: 05/18/24 0159    Specimen: Blood from Arm, Right Updated: 05/18/24 0207     WBC 10.65 10*3/mm3      RBC 3.62 10*6/mm3      Hemoglobin 10.8 g/dL      Hematocrit 33.7 %      MCV 93.1 fL      MCH 29.8 pg      MCHC 32.0 g/dL      RDW 11.9 %      RDW-SD 41.2 fl      MPV 9.4 fL      Platelets 206 10*3/mm3      Neutrophil % 75.0 %       Lymphocyte % 12.8 %      Monocyte % 8.5 %      Eosinophil % 2.8 %      Basophil % 0.4 %      Immature Grans % 0.5 %      Neutrophils, Absolute 7.99 10*3/mm3      Lymphocytes, Absolute 1.36 10*3/mm3      Monocytes, Absolute 0.91 10*3/mm3      Eosinophils, Absolute 0.30 10*3/mm3      Basophils, Absolute 0.04 10*3/mm3      Immature Grans, Absolute 0.05 10*3/mm3      nRBC 0.0 /100 WBC     Comprehensive Metabolic Panel [[id number]]  (Abnormal) Collected: 05/17/24 0347    Specimen: Blood from Arm, Left Updated: 05/17/24 0418     Glucose 125 mg/dL      BUN 15 mg/dL      Creatinine 0.88 mg/dL      Sodium 136 mmol/L      Potassium 4.6 mmol/L      Chloride 103 mmol/L      CO2 22.0 mmol/L      Calcium 8.5 mg/dL      Total Protein 5.9 g/dL      Albumin 3.8 g/dL      ALT (SGPT) 13 U/L      AST (SGOT) 22 U/L      Alkaline Phosphatase 74 U/L      Total Bilirubin 0.5 mg/dL      Globulin 2.1 gm/dL      A/G Ratio 1.8 g/dL      BUN/Creatinine Ratio 17.0     Anion Gap 11.0 mmol/L      eGFR 70.8 mL/min/1.73     Narrative:      GFR Normal >60  Chronic Kidney Disease <60  Kidney Failure <15      Protime-INR [[id number]]  (Normal) Collected: 05/17/24 0347    Specimen: Blood from Arm, Left Updated: 05/17/24 0414     Protime 10.7 Seconds      INR 0.98    aPTT [[id number]]  (Normal) Collected: 05/17/24 0347    Specimen: Blood from Arm, Left Updated: 05/17/24 0414     PTT 26.1 seconds     CBC Auto Differential [[id number]]  (Abnormal) Collected: 05/17/24 0347    Specimen: Blood from Arm, Left Updated: 05/17/24 0357     WBC 11.09 10*3/mm3      RBC 3.83 10*6/mm3      Hemoglobin 11.5 g/dL      Hematocrit 35.0 %      MCV 91.4 fL      MCH 30.0 pg      MCHC 32.9 g/dL      RDW 11.9 %      RDW-SD 39.6 fl      MPV 9.3 fL      Platelets 222 10*3/mm3      Neutrophil % 78.2 %      Lymphocyte % 12.4 %      Monocyte % 7.1 %      Eosinophil % 1.5 %      Basophil % 0.4 %      Immature Grans % 0.4 %      Neutrophils, Absolute 8.68 10*3/mm3      Lymphocytes,  Absolute 1.37 10*3/mm3      Monocytes, Absolute 0.79 10*3/mm3      Eosinophils, Absolute 0.17 10*3/mm3      Basophils, Absolute 0.04 10*3/mm3      Immature Grans, Absolute 0.04 10*3/mm3      nRBC 0.0 /100 WBC     Comprehensive Metabolic Panel [[id number]]  (Abnormal) Collected: 05/16/24 1359    Specimen: Blood Updated: 05/16/24 1441     Glucose 106 mg/dL      BUN 12 mg/dL      Creatinine 0.79 mg/dL      Sodium 139 mmol/L      Potassium 4.0 mmol/L      Comment: Slight hemolysis detected by analyzer. Result may be falsely elevated.        Chloride 103 mmol/L      CO2 23.0 mmol/L      Calcium 9.1 mg/dL      Total Protein 6.5 g/dL      Albumin 4.3 g/dL      ALT (SGPT) 17 U/L      AST (SGOT) 26 U/L      Comment: Slight hemolysis detected by analyzer. Result may be falsely elevated.        Alkaline Phosphatase 90 U/L      Total Bilirubin 0.3 mg/dL      Globulin 2.2 gm/dL      A/G Ratio 2.0 g/dL      BUN/Creatinine Ratio 15.2     Anion Gap 13.0 mmol/L      eGFR 80.6 mL/min/1.73     Narrative:      GFR Normal >60  Chronic Kidney Disease <60  Kidney Failure <15      Protime-INR [[id number]]  (Normal) Collected: 05/16/24 1359    Specimen: Blood Updated: 05/16/24 1417     Protime 10.3 Seconds      INR 0.94    aPTT [[id number]]  (Abnormal) Collected: 05/16/24 1359    Specimen: Blood Updated: 05/16/24 1417     PTT 21.7 seconds     CBC & Differential [[id number]]  (Abnormal) Collected: 05/16/24 1359    Specimen: Blood Updated: 05/16/24 1404    Narrative:      The following orders were created for panel order CBC & Differential.  Procedure                               Abnormality         Status                     ---------                               -----------         ------                     CBC Auto Differential[[id number]]        Abnormal            Final result                 Please view results for these tests on the individual orders.    CBC Auto Differential [[id number]]  (Abnormal) Collected: 05/16/24 1359    Specimen:  Blood Updated: 05/16/24 1404     WBC 11.01 10*3/mm3      RBC 3.68 10*6/mm3      Hemoglobin 11.0 g/dL      Hematocrit 34.5 %      MCV 93.8 fL      MCH 29.9 pg      MCHC 31.9 g/dL      RDW 11.8 %      RDW-SD 40.9 fl      MPV 9.6 fL      Platelets 199 10*3/mm3      Neutrophil % 81.6 %      Lymphocyte % 11.3 %      Monocyte % 5.5 %      Eosinophil % 0.7 %      Basophil % 0.4 %      Immature Grans % 0.5 %      Neutrophils, Absolute 8.98 10*3/mm3      Lymphocytes, Absolute 1.24 10*3/mm3      Monocytes, Absolute 0.61 10*3/mm3      Eosinophils, Absolute 0.08 10*3/mm3      Basophils, Absolute 0.04 10*3/mm3      Immature Grans, Absolute 0.06 10*3/mm3      nRBC 0.0 /100 WBC              Imaging Results (Last 48 Hours)       Procedure Component Value Units Date/Time    XR Pelvis 1 or 2 View [[id number]] Collected: 05/17/24 1401     Updated: 05/17/24 1405    Narrative:      XR PELVIS 1 OR 2 VW    Date of Exam: 5/17/2024 12:22 PM EDT    Indication: fracture    Comparison: 5/16/2024.    Findings:  AP pelvis was obtained. There is a left total hip replacement. The impacted subcapital right femoral neck fracture is unchanged. The femoral head remains within the acetabulum.      Impression:      Impression:  Stable appearance of a nondisplaced, impacted right subcapital femoral neck fracture.      Electronically Signed: Ladan Ray MD    5/17/2024 2:02 PM EDT    Workstation ID: [id number]    XR Chest 1 View [[id number]] Collected: 05/16/24 1602     Updated: 05/16/24 1605    Narrative:      XR CHEST 1 VW    Date of Exam: 5/16/2024 3:54 PM EDT    Indication: Preop hip fracture    Comparison: 9/11/2016    Findings:  Metallic surgical hardware projects over the lower thorax, similar since 9/11/2016. The lungs appear adequately aerated without consolidation or mass. No pleural effusion or pneumothorax is identified. The cardiomediastinal silhouette and pulmonary   vasculature appear within normal limits. No acute or suspicious osseous  lesion is identified. Bilateral breast implants are noted.      Impression:      Impression:  1.No acute radiographic abnormality is identified.      Electronically Signed: Jm Tovar MD    5/16/2024 4:03 PM EDT    Workstation ID: [id number]    XR Hip With or Without Pelvis 2 - 3 View Right [[id number]] Collected: 05/16/24 1307     Updated: 05/16/24 1310    Narrative:      XR HIP W OR WO PELVIS 2-3 VIEW RIGHT    Date of Exam: 5/16/2024 12:30 PM EDT    Indication: Fall right hip pain    Comparison: 9/20/2016.    Findings:  3 views. There is a subcapital right hip fracture with impaction. The femoral head remains seated within the acetabulum. The bony pelvic ring is intact. There is a left hip arthroplasty.      Impression:      Impression:  Impacted subcapital right hip fracture.      Electronically Signed: Lidya Arreola MD    5/16/2024 1:08 PM EDT    Workstation ID: [id number]                 I reviewed the patient's new clinical results.    Past Medical History:   Diagnosis Date    Asthma     Hyperlipidemia      Past Surgical History:   Procedure Laterality Date    HIP SURGERY Left          Medication Review:   Scheduled Meds:[Transfer Hold] escitalopram, 10 mg, Oral, Daily  [Transfer Hold] sodium chloride, 10 mL, Intravenous, Q12H      Continuous Infusions:sodium chloride 0.9 % with KCl 20 mEq, 100 mL/hr, Last Rate: 100 mL/hr (05/18/24 0717)      PRN Meds:.  [Transfer Hold] acetaminophen    [Transfer Hold] aluminum-magnesium hydroxide-simethicone    atropine    [Transfer Hold] senna-docusate sodium **AND** [Transfer Hold] polyethylene glycol **AND** [Transfer Hold] bisacodyl **AND** [Transfer Hold] bisacodyl    [Transfer Hold] calcium carbonate    diphenhydrAMINE    diphenhydrAMINE    ePHEDrine Sulfate (Pressors)    flumazenil    gelatin absorbable    hydrALAZINE    [Transfer Hold] HYDROcodone-acetaminophen    HYDROmorphone    HYDROmorphone    ipratropium-albuterol    labetalol    lidocaine (cardiac)     meperidine    [Transfer Hold] Morphine    naloxone    [Transfer Hold] nitroglycerin    [Transfer Hold] ondansetron    [Transfer Hold] ondansetron ODT **OR** [Transfer Hold] ondansetron    ondansetron    oxyCODONE    oxyCODONE    [COMPLETED] Insert Peripheral IV **AND** [Transfer Hold] sodium chloride    [Transfer Hold] sodium chloride    [Transfer Hold] sodium chloride    sodium chloride 30 mL, ropivacaine 0.5 % 30 mL mixture    [Transfer Hold] traMADol     Assessment & Plan   ACUTE RIGHT HIP FRACTURE---ORTHO CONSULTATION--SURGERY PLANNED TODAY AT 730PM AND DONE WITH DR REGALADO; PAIN MANAGEMENT; PT; [facility]PT REHAB [facility] PLANNED;CASE MANAGEMENT NOTIFIED;FOOT PUMPS  DOING WELL POST OP AND IS VERY MUCH PLEASED  LOW BP YESTERDAY---THOUGHT TO BE INTRAVASCULARLY DRY AND RESPONDED WELL TO HYDRATION; STABLE  PRIOR LEFT TOTAL HIP REPLACEMENT WITH ANTERIOR APPROACH---DID WELL.   OSTEOPOROSIS---ON PROLIA, VIT D, AND CALCIUM--LOW LEVEL HERE  DYSLIPIDEMIA--NEAR GOAL  ALLERGIC ASTHMA---STABLE  MINNIE---USES CPAP REGULARLY  VEGAN  FULL CODE STATUS  Principal Problem:    Closed right hip fracture          Plan for disposition:TO [facility] HOPEFULLY TOMORROW    Brynn Galicia MD  05/18/24  09:57 EDT              Electronically signed by Brynn Galicia MD at 05/18/24 3395

## 2024-05-19 NOTE — THERAPY EVALUATION
Patient Name: Irene Ospina  : 1955    MRN: [id number]                              Today's Date: 2024       Admit Date: 2024    Visit Dx:     ICD-10-CM ICD-9-CM   1. Fall, initial encounter  W19.XXXA E888.9   2. Closed subcapital fracture of right femur, initial encounter  S72.011A 820.09     Patient Active Problem List   Diagnosis    Status post total hip replacement, right     Past Medical History:   Diagnosis Date    Asthma     Hyperlipidemia      Past Surgical History:   Procedure Laterality Date    HIP SURGERY Left       General Information       Row Name 24          Physical Therapy Time and Intention    Document Type evaluation  -EL     Mode of Treatment physical therapy  -       Row Name 24          General Information    Patient Profile Reviewed yes  -EL     Prior Level of Function independent:;all household mobility;ADL's  -EL     Existing Precautions/Restrictions fall;hip, anterior;oxygen therapy device and L/min  -EL     Barriers to Rehab none identified  -       Row Name 24          Living Environment    People in Home spouse  -EL       Row Name 24          Home Main Entrance    Number of Stairs, Main Entrance four  -EL       Row Name 24          Stairs Within Home, Primary    Number of Stairs, Within Home, Primary other (see comments)  resides on main level  -EL       Row Name 24          Cognition    Orientation Status (Cognition) oriented x 4  -EL       Row Name 24          Safety Issues, Functional Mobility    Impairments Affecting Function (Mobility) endurance/activity tolerance;pain;strength;balance  -EL               User Key  (r) = Recorded By, (t) = Taken By, (c) = Cosigned By      Initials Name Provider Type    EL Jorge Hsu, PT Physical Therapist                   Mobility       Row Name 24 0941          Bed Mobility    Bed Mobility supine-sit  -EL     Supine-Sit [place] (Bed  Mobility) minimum assist (75% patient effort)  -EL     Assistive Device (Bed Mobility) bed rails  -EL     Comment, (Bed Mobility) assistance to bring RLE OOB  -       Row Name 05/19/24 0941          Bed-Chair Transfer    Bed-Chair [place] (Transfers) minimum assist (75% patient effort)  -EL     Assistive Device (Bed-Chair Transfers) walker, front-wheeled  -EL     Comment, (Bed-Chair Transfer) cueing provided for hand placement  -       Row Name 05/19/24 0941          Sit-Stand Transfer    Sit-Stand [place] (Transfers) contact guard  -EL     Assistive Device (Sit-Stand Transfers) walker, front-wheeled  -EL       Row Name 05/19/24 0941          Gait/Stairs (Locomotion)    Patient was able to Ambulate no, other medical factors prevent ambulation  -EL     Reason Patient was unable to Ambulate Hypotension  -EL               User Key  (r) = Recorded By, (t) = Taken By, (c) = Cosigned By      Initials Name Provider Type    EL Jorge Hsu, PT Physical Therapist                   Obj/Interventions       Row Name 05/19/24 1931          Range of Motion Comprehensive    General Range of Motion lower extremity range of motion deficits identified  -EL     Comment, General Range of Motion RLE limited due to pain  -       Row Name 05/19/24 1931          Strength Comprehensive (MMT)    General Manual Muscle Testing (MMT) Assessment lower extremity strength deficits identified  -EL     Comment, General Manual Muscle Testing (MMT) Assessment LLE WFL, RLE 3/5  -       Row Name 05/19/24 1931          Balance    Balance Assessment sitting static balance;standing dynamic balance;standing static balance  -EL     Static Sitting Balance independent  -EL     Static Standing Balance contact guard  -EL     Dynamic Standing Balance minimal assist  -       Row Name 05/19/24 1931          Sensory Assessment (Somatosensory)    Sensory Assessment (Somatosensory) sensation intact  -EL               User Key  (r) = Recorded By, (t)  = Taken By, (c) = Cosigned By      Initials Name Provider Type    Jorge Quintanilla, PT Physical Therapist                   Goals/Plan       Row Name 05/19/24 1935          Bed Mobility Goal 1 (PT)    Activity/Assistive Device (Bed Mobility Goal 1, PT) bed mobility activities, all  -EL     [place] Level/Cues Needed (Bed Mobility Goal 1, PT) modified independence  -EL     Time Frame (Bed Mobility Goal 1, PT) long term goal (LTG);2 weeks  -EL       Row Name 05/19/24 1935          Transfer Goal 1 (PT)    Activity/Assistive Device (Transfer Goal 1, PT) transfers, all;walker, rolling  -EL     [place] Level/Cues Needed (Transfer Goal 1, PT) modified independence  -EL     Time Frame (Transfer Goal 1, PT) long term goal (LTG);2 weeks  -EL       Row Name 05/19/24 1935          Gait Training Goal 1 (PT)    Activity/Assistive Device (Gait Training Goal 1, PT) gait (walking locomotion);walker, rolling  -EL     [place] Level (Gait Training Goal 1, PT) modified independence  -EL     Distance (Gait Training Goal 1, PT) 110  -EL     Time Frame (Gait Training Goal 1, PT) long term goal (LTG);2 weeks  -EL       Row Name 05/19/24 1935          Stairs Goal 1 (PT)    Activity/Assistive Device (Stairs Goal 1, PT) stairs, all skills  -EL     [place] Level/Cues Needed (Stairs Goal 1, PT) modified independence  -EL     Number of Stairs (Stairs Goal 1, PT) 4  -EL     Time Frame (Stairs Goal 1, PT) long term goal (LTG);2 weeks  -EL       Row Name 05/19/24 1935          Therapy Assessment/Plan (PT)    Planned Therapy Interventions (PT) bed mobility training;balance training;neuromuscular re-education;transfer training;gait training;patient/family education;stair training;strengthening  -EL               User Key  (r) = Recorded By, (t) = Taken By, (c) = Cosigned By      Initials Name Provider Type    Jorge Quintanilla, PT Physical Therapist                   Clinical Impression       Row Name 05/19/24 1931          Pain     Pretreatment Pain Rating 2/10  -EL     Posttreatment Pain Rating 4/10  -EL     Pain Location - Side/Orientation Right  -EL     Pain Location lower  -EL     Pain Location - extremity  -EL       Row Name 05/19/24 1931          Plan of Care Review    Plan of Care Reviewed With patient  -EL     Outcome Evaluation Pt is a 69 YO F POD 1 R ant CHARISSA, following fall at home with frx. Pt reports living at home with spouse. Pt has 4 MACO and is generaly very independent with all ADLs, ambluation wihtout AD and no other recent falls. This fall happened while gardening. Pt reports spouse is undergoing cancer treatment and is unable to assist with ADLs if needed. Pt this date demonstrates fair mobility, requiring MIN A for bed mobility, and MIN A to tranfser from bed to bedside chair. Pt limited to transfers this date due to hypotension. Pt is below baseline and does not appear safe for return home at this time, recommendation is acute IP rehab at d/c.  -EL       Row Name 05/19/24 1931          Therapy Assessment/Plan (PT)    Rehab Potential (PT) good, to achieve stated therapy goals  -EL     Criteria for Skilled Interventions Met (PT) yes  -EL     Therapy Frequency (PT) 5 times/wk  -EL     Predicted Duration of Therapy Intervention (PT) until d/c  -       Row Name 05/19/24 1931          Vital Signs    Pre Systolic BP Rehab 98  -EL     Pre Treatment Diastolic BP 43  -EL     Intra Systolic BP Rehab 96  -EL     Intra Treatment Diastolic BP 42  -EL     Post Systolic BP Rehab 109  -EL     Post Treatment Diastolic BP 50  -EL     Pretreatment Heart Rate (beats/min) 83  -EL     Pre SpO2 (%) 97  -EL     O2 Delivery Pre Treatment supplemental O2  4L  -EL     Intra SpO2 (%) 90  -EL     O2 Delivery Intra Treatment supplemental O2  -EL     Post SpO2 (%) 98  -EL     O2 Delivery Post Treatment supplemental O2  -EL     Pre Patient Position Supine  -EL     Intra Patient Position Standing  -EL     Post Patient Position Sitting  -EL       Row Name  05/19/24 1931          Positioning and Restraints    Pre-Treatment Position in bed  -EL     Post Treatment Position chair  -EL     In Chair notified nsg;reclined;call light within reach;encouraged to call for assist;exit alarm on  -EL               User Key  (r) = Recorded By, (t) = Taken By, (c) = Cosigned By      Initials Name Provider Type    Jorge Quintanilla, PT Physical Therapist                   Outcome Measures       Row Name 05/19/24 1936 05/19/24 0809       How much help from another person do you currently need...    Turning from your back to your side while in flat bed without using bedrails? 3  -EL 3  -MS    Moving from lying on back to sitting on the side of a flat bed without bedrails? 3  -EL 3  -MS    Moving to and from a bed to a chair (including a wheelchair)? 3  -EL 3  -MS    Standing up from a chair using your arms (e.g., wheelchair, bedside chair)? 2  -EL 2  -MS    Climbing 3-5 steps with a railing? 1  -EL 1  -MS    To walk in hospital room? 2  -EL 2  -MS    AM-PAC 6 Clicks Score (PT) 14  -EL 14  -MS    Highest Level of Mobility Goal 4 --> Transfer to chair/commode  -EL 4 --> Transfer to chair/commode  -MS      Row Name 05/19/24 1936 05/19/24 1613       Functional Assessment    Outcome Measure Options AM-PAC 6 Clicks Basic Mobility (PT)  -EL AM-PAC 6 Clicks Daily Activity (OT)  -LUKE              User Key  (r) = Recorded By, (t) = Taken By, (c) = Cosigned By      Initials Name Provider Type    Jorge Quintanilla, PT Physical Therapist    Kathleen Funes OT Occupational Therapist    Anneliese Friend LPN Licensed Nurse                                 Physical Therapy Education       Title: PT OT SLP Therapies (Done)       Topic: Physical Therapy (Done)       Point: Mobility training (Done)       Learning Progress Summary             Patient Acceptance, E,TB, VU by JENNY at 5/19/2024 1936                         Point: Precautions (Done)       Learning Progress Summary             Patient Acceptance,  E,TB, VU by EL at 5/19/2024 1936                                         User Key       Initials Effective Dates Name Provider Type Discipline     06/23/20 -  Jorge Hsu, PT Physical Therapist PT                  PT Recommendation and Plan  Planned Therapy Interventions (PT): bed mobility training, balance training, neuromuscular re-education, transfer training, gait training, patient/family education, stair training, strengthening  Plan of Care Reviewed With: patient  Outcome Evaluation: Pt is a 67 YO F POD 1 R ant CHARISSA, following fall at home with frx. Pt reports living at home with spouse. Pt has 4 MACO and is generaly very independent with all ADLs, ambluation wihtout AD and no other recent falls. This fall happened while gardening. Pt reports spouse is undergoing cancer treatment and is unable to assist with ADLs if needed. Pt this date demonstrates fair mobility, requiring MIN A for bed mobility, and MIN A to tranfser from bed to bedside chair. Pt limited to transfers this date due to hypotension. Pt is below baseline and does not appear safe for return home at this time, recommendation is acute IP rehab at d/c.     Time Calculation:   PT Evaluation Complexity  History, PT Evaluation Complexity: 1-2 personal factors and/or comorbidities  Examination of Body Systems (PT Eval Complexity): total of 3 or more elements  Clinical Presentation (PT Evaluation Complexity): evolving  Clinical Decision Making (PT Evaluation Complexity): moderate complexity  Overall Complexity (PT Evaluation Complexity): moderate complexity     PT Charges       Row Name 05/19/24 1936             Time Calculation    Start Time 0941  -EL      Stop Time 1006  -EL      Time Calculation (min) 25 min  -EL      PT Received On 05/19/24  -EL      PT - Next Appointment 05/20/24  -EL      PT Goal Re-Cert Due Date 06/02/24  -EL                User Key  (r) = Recorded By, (t) = Taken By, (c) = Cosigned By      Initials Name Provider Type    EL Shadi  Jorge, PT Physical Therapist                  Therapy Charges for Today       Code Description Service Date Service Provider Modifiers Qty    52421439641 HC PT EVAL MOD COMPLEXITY 4 5/19/2024 Jorge Hsu, PT GP 1            PT G-Codes  Outcome Measure Options: AM-PAC 6 Clicks Basic Mobility (PT)  AM-PAC 6 Clicks Score (PT): 14  AM-PAC 6 Clicks Score (OT): 18  PT Discharge Summary  Anticipated Discharge Disposition (PT): inpatient rehabilitation facility    Jorge Hsu PT  5/19/2024

## 2024-05-20 ENCOUNTER — APPOINTMENT (OUTPATIENT)
Dept: ULTRASOUND IMAGING | Facility: HOSPITAL | Age: 69
End: 2024-05-20
Payer: MEDICARE

## 2024-05-20 LAB
ABO GROUP BLD: NORMAL
ALBUMIN SERPL-MCNC: 3.1 G/DL (ref 3.5–5.2)
ALBUMIN/GLOB SERPL: 1.4 G/DL
ALP SERPL-CCNC: 94 U/L (ref 39–117)
ALT SERPL W P-5'-P-CCNC: 33 U/L (ref 1–33)
ANION GAP SERPL CALCULATED.3IONS-SCNC: 6 MMOL/L (ref 5–15)
AST SERPL-CCNC: 57 U/L (ref 1–32)
BASOPHILS # BLD AUTO: 0.05 10*3/MM3 (ref 0–0.2)
BASOPHILS NFR BLD AUTO: 0.5 % (ref 0–1.5)
BILIRUB SERPL-MCNC: 0.4 MG/DL (ref 0–1.2)
BLD GP AB SCN SERPL QL: NEGATIVE
BUN SERPL-MCNC: 11 MG/DL (ref 8–23)
BUN/CREAT SERPL: 16.2 (ref 7–25)
CALCIUM SPEC-SCNC: 8.4 MG/DL (ref 8.6–10.5)
CHLORIDE SERPL-SCNC: 100 MMOL/L (ref 98–107)
CO2 SERPL-SCNC: 29 MMOL/L (ref 22–29)
CREAT SERPL-MCNC: 0.68 MG/DL (ref 0.57–1)
DEPRECATED RDW RBC AUTO: 40.8 FL (ref 37–54)
EGFRCR SERPLBLD CKD-EPI 2021: 93.8 ML/MIN/1.73
EOSINOPHIL # BLD AUTO: 0.5 10*3/MM3 (ref 0–0.4)
EOSINOPHIL NFR BLD AUTO: 4.6 % (ref 0.3–6.2)
ERYTHROCYTE [DISTWIDTH] IN BLOOD BY AUTOMATED COUNT: 12 % (ref 12.3–15.4)
GLOBULIN UR ELPH-MCNC: 2.2 GM/DL
GLUCOSE SERPL-MCNC: 106 MG/DL (ref 65–99)
HCT VFR BLD AUTO: 25.4 % (ref 34–46.6)
HCT VFR BLD AUTO: 27.2 % (ref 34–46.6)
HGB BLD-MCNC: 8 G/DL (ref 12–15.9)
HGB BLD-MCNC: 8.6 G/DL (ref 12–15.9)
IMM GRANULOCYTES # BLD AUTO: 0.07 10*3/MM3 (ref 0–0.05)
IMM GRANULOCYTES NFR BLD AUTO: 0.6 % (ref 0–0.5)
LYMPHOCYTES # BLD AUTO: 1.52 10*3/MM3 (ref 0.7–3.1)
LYMPHOCYTES NFR BLD AUTO: 14 % (ref 19.6–45.3)
MAGNESIUM SERPL-MCNC: 2.1 MG/DL (ref 1.6–2.4)
MCH RBC QN AUTO: 29.4 PG (ref 26.6–33)
MCHC RBC AUTO-ENTMCNC: 31.5 G/DL (ref 31.5–35.7)
MCV RBC AUTO: 93.4 FL (ref 79–97)
MONOCYTES # BLD AUTO: 1.16 10*3/MM3 (ref 0.1–0.9)
MONOCYTES NFR BLD AUTO: 10.7 % (ref 5–12)
NEUTROPHILS NFR BLD AUTO: 69.6 % (ref 42.7–76)
NEUTROPHILS NFR BLD AUTO: 7.53 10*3/MM3 (ref 1.7–7)
NRBC BLD AUTO-RTO: 0 /100 WBC (ref 0–0.2)
PLATELET # BLD AUTO: 192 10*3/MM3 (ref 140–450)
PMV BLD AUTO: 9.9 FL (ref 6–12)
POTASSIUM SERPL-SCNC: 4 MMOL/L (ref 3.5–5.2)
PROT SERPL-MCNC: 5.3 G/DL (ref 6–8.5)
RBC # BLD AUTO: 2.72 10*6/MM3 (ref 3.77–5.28)
RH BLD: POSITIVE
SODIUM SERPL-SCNC: 135 MMOL/L (ref 136–145)
T&S EXPIRATION DATE: NORMAL
WBC NRBC COR # BLD AUTO: 10.83 10*3/MM3 (ref 3.4–10.8)

## 2024-05-20 PROCEDURE — 85014 HEMATOCRIT: CPT | Performed by: FAMILY MEDICINE

## 2024-05-20 PROCEDURE — 80053 COMPREHEN METABOLIC PANEL: CPT | Performed by: FAMILY MEDICINE

## 2024-05-20 PROCEDURE — P9016 RBC LEUKOCYTES REDUCED: HCPCS

## 2024-05-20 PROCEDURE — 86900 BLOOD TYPING SEROLOGIC ABO: CPT | Performed by: FAMILY MEDICINE

## 2024-05-20 PROCEDURE — 36430 TRANSFUSION BLD/BLD COMPNT: CPT

## 2024-05-20 PROCEDURE — 85025 COMPLETE CBC W/AUTO DIFF WBC: CPT | Performed by: FAMILY MEDICINE

## 2024-05-20 PROCEDURE — 85018 HEMOGLOBIN: CPT | Performed by: FAMILY MEDICINE

## 2024-05-20 PROCEDURE — 25010000002 NA FERRIC GLUC CPLX PER 12.5 MG: Performed by: FAMILY MEDICINE

## 2024-05-20 PROCEDURE — 86901 BLOOD TYPING SEROLOGIC RH(D): CPT | Performed by: FAMILY MEDICINE

## 2024-05-20 PROCEDURE — 25810000003 SODIUM CHLORIDE 0.9 % SOLUTION: Performed by: FAMILY MEDICINE

## 2024-05-20 PROCEDURE — 86850 RBC ANTIBODY SCREEN: CPT | Performed by: FAMILY MEDICINE

## 2024-05-20 PROCEDURE — 86900 BLOOD TYPING SEROLOGIC ABO: CPT

## 2024-05-20 PROCEDURE — 83735 ASSAY OF MAGNESIUM: CPT | Performed by: FAMILY MEDICINE

## 2024-05-20 PROCEDURE — 97112 NEUROMUSCULAR REEDUCATION: CPT

## 2024-05-20 PROCEDURE — 97110 THERAPEUTIC EXERCISES: CPT

## 2024-05-20 PROCEDURE — 97530 THERAPEUTIC ACTIVITIES: CPT

## 2024-05-20 PROCEDURE — 86923 COMPATIBILITY TEST ELECTRIC: CPT

## 2024-05-20 RX ADMIN — ACETAMINOPHEN 650 MG: 325 TABLET, FILM COATED ORAL at 18:26

## 2024-05-20 RX ADMIN — Medication 10 ML: at 08:09

## 2024-05-20 RX ADMIN — ESCITALOPRAM OXALATE 10 MG: 10 TABLET ORAL at 08:09

## 2024-05-20 RX ADMIN — SODIUM CHLORIDE 250 MG: 9 INJECTION, SOLUTION INTRAVENOUS at 08:09

## 2024-05-20 RX ADMIN — DOCUSATE SODIUM 100 MG: 100 CAPSULE, LIQUID FILLED ORAL at 08:09

## 2024-05-20 RX ADMIN — MAGNESIUM HYDROXIDE 10 ML: 2400 SUSPENSION ORAL at 08:09

## 2024-05-20 RX ADMIN — DOCUSATE SODIUM 100 MG: 100 CAPSULE, LIQUID FILLED ORAL at 21:12

## 2024-05-20 RX ADMIN — HYDROCODONE BITARTRATE AND ACETAMINOPHEN 1 TABLET: 7.5; 325 TABLET ORAL at 06:39

## 2024-05-20 RX ADMIN — HYDROCODONE BITARTRATE AND ACETAMINOPHEN 1 TABLET: 7.5; 325 TABLET ORAL at 21:12

## 2024-05-20 NOTE — PLAN OF CARE
Goal Outcome Evaluation:  Plan of Care Reviewed With: patient        Progress: improving     A&ox4. Blood transfusion currently infusing. Able to communicate needs. Plan ofcare ongoing.     Problem: Adult Inpatient Plan of Care  Goal: Plan of Care Review  Outcome: Ongoing, Progressing  Flowsheets (Taken 5/20/2024 5987)  Progress: improving  Plan of Care Reviewed With: patient  Goal: Patient-Specific Goal (Individualized)  Outcome: Ongoing, Progressing  Goal: Absence of Hospital-Acquired Illness or Injury  Outcome: Ongoing, Progressing  Goal: Optimal Comfort and Wellbeing  Outcome: Ongoing, Progressing  Goal: Readiness for Transition of Care  Outcome: Ongoing, Progressing     Problem: Asthma Comorbidity  Goal: Maintenance of Asthma Control  Outcome: Ongoing, Progressing     Problem: Obstructive Sleep Apnea Risk or Actual Comorbidity Management  Goal: Unobstructed Breathing During Sleep  Outcome: Ongoing, Progressing     Problem: Osteoarthritis Comorbidity  Goal: Maintenance of Osteoarthritis Symptom Control  Outcome: Ongoing, Progressing     Problem: Skin Injury Risk Increased  Goal: Skin Health and Integrity  Outcome: Ongoing, Progressing     Problem: Fall Injury Risk  Goal: Absence of Fall and Fall-Related Injury  Outcome: Ongoing, Progressing

## 2024-05-20 NOTE — PROGRESS NOTES
"DATE/TIME OF ADMISSION:  5/16/2024 12:02 PM     LOS: 4 days   Patient Care Team:  Brynn Galicia MD as PCP - General (Family Medicine)  Consults       Date and Time Order Name Status Description    5/16/2024  2:21 PM Ortho (on-call MD unless specified) Completed     5/16/2024  1:20 PM Family Medicine Consult              Subjective AWAITING INPT REHAB BED AVAILABILITY AND PLACEMENT---BED AVAILABLE AT [facility] BUT DUE TO HYPOTENSION, DIZZINESS TO STAND AND WALK, AND ANEMIA, SHE WILL BE GETTING ONE UNIT OF PRBC TODAY  HOPEFULLY, TRANSFER TO [facility] TOMORROW IF H/H REMAINS STABLE AND LESS SYMPTOMATIC WITH POST OP ANEMIA    Interval History: POD #2 RIGHT TOTAL HIP REPLACEMENT    Patient Complaints: NO PAIN AND HAS NOT TAKEN MEDS FOR PAIN SINCE EARLY AM  HAD SOME MILD BLEEDING NOTED FROM THE VAGINA  HAS HER PESSARY IN PLACE    History taken from: patient    Review of Systems        Objective     Vital Signs  BP 95/59 (BP Location: Right arm, Patient Position: Lying)   Pulse 81   Temp 97.8 °F (36.6 °C) (Oral)   Resp 18   Ht 160 cm (63\")   Wt 73.6 kg (162 lb 4.1 oz)   SpO2 100%   BMI 28.74 kg/m²     Physical Exam:   GETTING BLOOD NOW     General Appearance:    Alert, cooperative, in no acute distress; SITTING IN THE BEDSIDE CHAIR   Head:    Normocephalic, without obvious abnormality, atraumatic   Eyes:            Lids and lashes normal, conjunctivae and sclerae normal, no   icterus, no pallor, corneas clear, PERRLA   Ears:    Ears appear intact with no abnormalities noted   Throat:   No oral lesions, no thrush, oral mucosa moist   Neck:   No adenopathy, supple, trachea midline, no thyromegaly, no   carotid bruit, no JVD   Lungs:     Clear to auscultation,respirations regular, even and                  unlabored    Heart:    Regular rhythm and normal rate, normal S1 and S2, no            murmur, no gallop, no rub, no click   Chest Wall:    No abnormalities observed EXCEPT FOR PECTUS EXCAVATUM   Abdomen:     Normal bowel " sounds, no masses, no organomegaly, soft        non-tender, non-distended, no guarding, no rebound                tenderness   Extremities:   Moves all extremities well, no edema, no cyanosis, no             redness; NO CALF TENDERNESS   Pulses:   Pulses palpable and equal bilaterally   Skin:   No bleeding, bruising or rash   Lymph nodes:   No palpable adenopathy   Neurologic:   Grossly normal, alert and O x 3        I HAVE PERSONALLY EXAMINED AND REVIEWED THE PATIENT'S RECORD     Lab Results (last 48 hours)       Procedure Component Value Units Date/Time    Basic Metabolic Panel [[id number]]  (Abnormal) Collected: 05/19/24 0140    Specimen: Blood from Arm, Left Updated: 05/19/24 0228     Glucose 144 mg/dL      BUN 13 mg/dL      Creatinine 0.76 mg/dL      Sodium 138 mmol/L      Potassium 4.4 mmol/L      Chloride 102 mmol/L      CO2 26.0 mmol/L      Calcium 8.2 mg/dL      BUN/Creatinine Ratio 17.1     Anion Gap 10.0 mmol/L      eGFR 84.4 mL/min/1.73     Narrative:      GFR Normal >60  Chronic Kidney Disease <60  Kidney Failure <15      CBC & Differential [[id number]]  (Abnormal) Collected: 05/19/24 0140    Specimen: Blood from Arm, Left Updated: 05/19/24 0147    Narrative:      The following orders were created for panel order CBC & Differential.  Procedure                               Abnormality         Status                     ---------                               -----------         ------                     CBC Auto Differential[[id number]]        Abnormal            Final result                 Please view results for these tests on the individual orders.    CBC Auto Differential [[id number]]  (Abnormal) Collected: 05/19/24 0140    Specimen: Blood from Arm, Left Updated: 05/19/24 0147     WBC 12.25 10*3/mm3      RBC 3.15 10*6/mm3      Hemoglobin 9.4 g/dL      Hematocrit 29.1 %      MCV 92.4 fL      MCH 29.8 pg      MCHC 32.3 g/dL      RDW 11.8 %      RDW-SD 40.0 fl      MPV 9.6 fL      Platelets 181 10*3/mm3       Neutrophil % 84.3 %      Lymphocyte % 7.4 %      Monocyte % 7.5 %      Eosinophil % 0.0 %      Basophil % 0.1 %      Immature Grans % 0.7 %      Neutrophils, Absolute 10.32 10*3/mm3      Lymphocytes, Absolute 0.91 10*3/mm3      Monocytes, Absolute 0.92 10*3/mm3      Eosinophils, Absolute 0.00 10*3/mm3      Basophils, Absolute 0.01 10*3/mm3      Immature Grans, Absolute 0.09 10*3/mm3      nRBC 0.0 /100 WBC              Imaging Results (Last 48 Hours)       Procedure Component Value Units Date/Time    XR Hip With or Without Pelvis 2 - 3 View Right [[id number]] Collected: 05/18/24 1141     Updated: 05/18/24 1148    Narrative:      XR HIP W OR WO PELVIS 2-3 VIEW RIGHT    Date of Exam: 5/18/2024 11:21 AM EDT    Indication: Post-Op Hip Arthroplasty    Comparison: None available.    Findings: Right hip replacement. Surrounding postoperative changes. Hardware is intact and appropriately situated. Previous left hip replacement. The pubic bones are intact. The sacroiliac joints are normal.      Impression:      Postop changes.      Electronically Signed: Jad Reich MD    5/18/2024 11:46 AM EDT    Workstation ID: [id number]    FL C Arm During Surgery [[id number]] Resulted: 05/18/24 1013     Updated: 05/18/24 1013    Narrative:      This procedure was auto-finalized with no dictation required.    XR Hip With or Without Pelvis 2 - 3 View Right [[id number]] Resulted: 05/18/24 1012     Updated: 05/18/24 1012    Narrative:      This procedure was auto-finalized with no dictation required.                 I reviewed the patient's new clinical results.    Past Medical History:   Diagnosis Date    Asthma     Hyperlipidemia      Past Surgical History:   Procedure Laterality Date    HIP SURGERY Left          Medication Review:   Scheduled Meds:aspirin, 81 mg, Oral, Daily  docusate sodium, 100 mg, Oral, BID  escitalopram, 10 mg, Oral, Daily  ferric gluconate, 250 mg, Intravenous, Daily  magnesium hydroxide, 10 mL, Oral, Daily  sodium  chloride, 10 mL, Intravenous, Q12H      Continuous Infusions:   PRN Meds:.  acetaminophen    aluminum-magnesium hydroxide-simethicone    senna-docusate sodium **AND** polyethylene glycol **AND** bisacodyl **AND** bisacodyl    calcium carbonate    HYDROcodone-acetaminophen    HYDROmorphone **AND** naloxone    Morphine    nitroglycerin    ondansetron    ondansetron ODT **OR** ondansetron    [COMPLETED] Insert Peripheral IV **AND** sodium chloride    sodium chloride    sodium chloride     Assessment & Plan   ACUTE RIGHT HIP FRACTURE POD #2---ORTHO CONSULTATION--SURGERY DONE  YESTERDAY WITH DR REGALADO; PAIN MANAGEMENT; PT; INPT REHAB AT [facility] OR [facility] PLANNED;CASE MANAGEMENT NOTIFIED;FOOT PUMPS  DOING WELL POST OP AND IS VERY MUCH PLEASED  ORTHOSTATIC HYPOTENSION---THOUGHT TO BE INTRAVASCULARLY DRY AND RESPONDED WELL TO HYDRATION; STABLE;  WILL GIVE A 1 LITER BOLUS OF NORMAL SALINE TODAY AND IV IRON REPLACEMENT; NOW, DOES QUALIFY FOR BLOOD TRANSFUSION; 1 UNIT OF BLOOD WILL BE GIVEN ;RECHECK IN THE AM WITH LABS  C/O VAGINAL BLEEDING---GYN CONSULTED PRIMARILY DUE TO THE PESSARY AND MY CONCERN ABOUT EROSION OF THE VAGINAL WALL DUE TO THE PESSARY; WILL DO PELVIC ULTRASOUND BUT WILL NEED GYN EXAM DUE TO THE PESSARY AND IT MAY JUST NEED TO BE REMOVED AND LEFT OUT FOR A WHILE  PRIOR LEFT TOTAL HIP REPLACEMENT WITH ANTERIOR APPROACH---DID WELL.   OSTEOPOROSIS---ON PROLIA, VIT D, AND CALCIUM--LOW LEVEL HERE; WILL ENCOURAGE SUPPLEMENTATION  DYSLIPIDEMIA--NEAR GOAL  ALLERGIC ASTHMA---STABLE  MINNIE---USES CPAP REGULARLY  HYPOXIA WITH PAIN MEDS AND LAYING DOWN---O2 PRN PIPED IN TO THE CPAP; SHE DOES NOT REQUIRE THIS AT HOME  ANEMIA DUE TO INTRAOPERATIVE BLOOD LOSS---IV IRON X 2 DOSES; WILL WAIT ON BLOOD AS DOES NOT MEET REQUIREMENTS FOR TRANSFUSION AT THIS TIME  VEGAN  FULL CODE STATUS  Active Problems:    Status post total hip replacement, right          Plan for disposition:TO [facility] FOR REHAB HOPEFULLY IN THE AM    Brynn Galicia,  MD  05/20/24  06:46 EDT

## 2024-05-20 NOTE — PLAN OF CARE
Goal Outcome Evaluation:            Patient is A & O X 4. The patients pain was treated per the MAR. Patient is able to make her needs known and the call light is with in reach. Will continue with the patients care.                                    depressed/fever

## 2024-05-20 NOTE — CONSULTS
Administered the [facility], [facility] & [facility].

## 2024-05-20 NOTE — PLAN OF CARE
"Assessment: Irene Ospina is POD#2 s/p R anterior CHARISSA after falling and fracturing her hip. Pt presents with functional mobility impairments which indicate the need for skilled intervention. Pt on bedside commode upon arrival, IND with carlos-care. Pt required Shira for STS with RW, and reporting lightheadedness. After transferring back to the bed, pt reported her symptoms were feeling worse, so took her BP which was hypotensive. Vitals written above. Pt described her dizziness as \"disoriented.\" Pt agreeable for transferring to bedside recliner, requiring CGA for 2nd STS and Shira for short ambulation to transfer. Pt remained hypotensive throughout, notified RN and MD. Provided pt with handout regarding post-op exercises. Tolerating session today without incident. Will continue to follow and progress as tolerated.     Vitals: Desaturates and Hypotensive  103/46 (65) sitting EOB after use of the commode (symptomatic)  102/43 (59) sitting EOB after there ex and 1 min rest (symptomatic)  112/48 (59) after transferring to recliner  96/49 (61) after sitting in recliner for 5 min.  In recliner, pt appeared pale, reports SOA. O2 read 84% with good pleth, cues for breath work, improved to 93%.   "

## 2024-05-20 NOTE — THERAPY TREATMENT NOTE
"Subjective: Pt agreeable to therapeutic plan of care. Pt reports irregular vaginal bleeding to PT upon arrival.    Objective:     Bed mobility - N/A or Not attempted. On BSC upon arrival.  Transfers - Min-A and with rolling walker for STS and transfer to recliner.  Ambulation - 3 feet Min-A and with rolling walker forward from commode, and to transfer to recliner.    WB'ing status: R Lower Extremity Weight Bearing As Tolerated    ROM: limited hip flexion d/t pain    Therapeutic Exercise: 10 Reps B LE AROM Total Hip: Ankle Pumps, Glut Sets, Heel slides <90 degrees, Hip Abduction, Mini-Squat (pt not able to perform mini-squat 2/2 hypotension.    Precautions: Anterior hip precautions     Vitals: Desaturates and Hypotensive  103/46 (65) sitting EOB after use of the commode (symptomatic)  102/43 (59) sitting EOB after there ex and 1 min rest (symptomatic)  112/48 (59) after transferring to recliner  96/49 (61) after sitting in recliner for 5 min.    In recliner, pt appeared pale, reports SOA. O2 read 84% with good pleth, cues for breath work, improved to 93%.    Pain: 3 VAS   Location: incisional hip, radiating to back  Intervention for pain: Repositioned, RN notified, Increased Activity, and Therapeutic Presence    Education: Provided education on the importance of mobility in the acute care setting, Verbal/Tactile Cues, Transfer Training, Gait Training, Energy conservation strategies, and Post-Op Precautions    Assessment: Irene Ospina is POD#2 s/p R anterior CHARISSA after falling and fracturing her hip. Pt presents with functional mobility impairments which indicate the need for skilled intervention. Pt on bedside commode upon arrival, IND with carlos-care. Pt required Shira for STS with RW, and reporting lightheadedness. After transferring back to the bed, pt reported her symptoms were feeling worse, so took her BP which was hypotensive. Vitals written above. Pt described her dizziness as \"disoriented.\" Pt agreeable for " transferring to bedside recliner, requiring CGA for 2nd STS and Shira for short ambulation to transfer. Pt remained hypotensive throughout, notified RN and MD. Provided pt with handout regarding post-op exercises. Tolerating session today without incident. Will continue to follow and progress as tolerated.     Plan/Recommendations:   If medically appropriate, High Intensity Therapy recommended post-acute care. This is recommended as therapy feels the patient would require 5-6 days per week, 2-3 hours per day. At this time, inpatient rehabilitation (acute rehab) would be the first choice and SNF would be second. Pt requires no DME at discharge.     Pt desires Inpatient Rehabilitation placement at discharge. Pt cooperative; agreeable to therapeutic recommendations and plan of care.         Basic Mobility 6-click:  Rollin = Total, A lot = 2, A little = 3; 4 = None  Supine>Sit:   1 = Total, A lot = 2, A little = 3; 4 = None   Sit>Stand with arms:  1 = Total, A lot = 2, A little = 3; 4 = None  Bed>Chair:   1 = Total, A lot = 2, A little = 3; 4 = None  Ambulate in room:  1 = Total, A lot = 2, A little = 3; 4 = None  3-5 Steps with railin = Total, A lot = 2, A little = 3; 4 = None  Score: 17    Modified [place]: N/A = No pre-op stroke/TIA    Post-Tx Position: Supine with HOB Elevated, Alarms activated, and Call light and personal items within reach  PPE: gloves

## 2024-05-20 NOTE — CASE MANAGEMENT/SOCIAL WORK
Continued Stay Note  RUPA Wilkinson     Patient Name: Irene Ospina  MRN: [id number]  Today's Date: 5/20/2024    Admit Date: 5/16/2024    Plan: Accepted to [facility] rehab.  No precert or PASRR needed.  Bed ready today5/202/24,however patient not quite ready.   Discharge Plan       Row Name 05/20/24 1231       Plan    Plan Accepted to [facility] rehab.  No precert or PASRR needed.  Bed ready today5/202/24,however patient not quite ready.    Plan Comments DC barriers: POD#2 right CHARISSA, monitoring hgb,  transfusing,  GYN consult, BM. 02                    Expected Discharge Date and Time       Expected Discharge Date Expected Discharge Time    May 21, 2024               Niesha Maria RN

## 2024-05-21 ENCOUNTER — APPOINTMENT (OUTPATIENT)
Dept: ULTRASOUND IMAGING | Facility: HOSPITAL | Age: 69
End: 2024-05-21
Payer: MEDICARE

## 2024-05-21 VITALS
WEIGHT: 162.26 LBS | OXYGEN SATURATION: 95 % | BODY MASS INDEX: 28.75 KG/M2 | DIASTOLIC BLOOD PRESSURE: 62 MMHG | SYSTOLIC BLOOD PRESSURE: 111 MMHG | HEIGHT: 63 IN | RESPIRATION RATE: 18 BRPM | HEART RATE: 82 BPM | TEMPERATURE: 97.4 F

## 2024-05-21 PROBLEM — N95.0 POST-MENOPAUSAL BLEEDING: Status: ACTIVE | Noted: 2024-05-21

## 2024-05-21 PROBLEM — D50.9 IRON DEFICIENCY ANEMIA: Status: ACTIVE | Noted: 2024-05-21

## 2024-05-21 LAB
ALBUMIN SERPL-MCNC: 3.2 G/DL (ref 3.5–5.2)
ALBUMIN/GLOB SERPL: 1.3 G/DL
ALP SERPL-CCNC: 107 U/L (ref 39–117)
ALT SERPL W P-5'-P-CCNC: 43 U/L (ref 1–33)
ANION GAP SERPL CALCULATED.3IONS-SCNC: 6 MMOL/L (ref 5–15)
AST SERPL-CCNC: 66 U/L (ref 1–32)
BASOPHILS # BLD AUTO: 0.06 10*3/MM3 (ref 0–0.2)
BASOPHILS NFR BLD AUTO: 0.6 % (ref 0–1.5)
BH BB BLOOD EXPIRATION DATE: NORMAL
BH BB BLOOD TYPE BARCODE: 5100
BH BB DISPENSE STATUS: NORMAL
BH BB PRODUCT CODE: NORMAL
BH BB UNIT NUMBER: NORMAL
BILIRUB SERPL-MCNC: 0.6 MG/DL (ref 0–1.2)
BUN SERPL-MCNC: 9 MG/DL (ref 8–23)
BUN/CREAT SERPL: 13.4 (ref 7–25)
CALCIUM SPEC-SCNC: 8.7 MG/DL (ref 8.6–10.5)
CHLORIDE SERPL-SCNC: 102 MMOL/L (ref 98–107)
CO2 SERPL-SCNC: 31 MMOL/L (ref 22–29)
CREAT SERPL-MCNC: 0.67 MG/DL (ref 0.57–1)
CROSSMATCH INTERPRETATION: NORMAL
DEPRECATED RDW RBC AUTO: 43.6 FL (ref 37–54)
EGFRCR SERPLBLD CKD-EPI 2021: 94.2 ML/MIN/1.73
EOSINOPHIL # BLD AUTO: 0.57 10*3/MM3 (ref 0–0.4)
EOSINOPHIL NFR BLD AUTO: 5.9 % (ref 0.3–6.2)
ERYTHROCYTE [DISTWIDTH] IN BLOOD BY AUTOMATED COUNT: 12.8 % (ref 12.3–15.4)
GLOBULIN UR ELPH-MCNC: 2.5 GM/DL
GLUCOSE SERPL-MCNC: 110 MG/DL (ref 65–99)
HCT VFR BLD AUTO: 28.8 % (ref 34–46.6)
HGB BLD-MCNC: 9.1 G/DL (ref 12–15.9)
IMM GRANULOCYTES # BLD AUTO: 0.09 10*3/MM3 (ref 0–0.05)
IMM GRANULOCYTES NFR BLD AUTO: 0.9 % (ref 0–0.5)
LYMPHOCYTES # BLD AUTO: 1.41 10*3/MM3 (ref 0.7–3.1)
LYMPHOCYTES NFR BLD AUTO: 14.6 % (ref 19.6–45.3)
MAGNESIUM SERPL-MCNC: 2.5 MG/DL (ref 1.6–2.4)
MCH RBC QN AUTO: 29.5 PG (ref 26.6–33)
MCHC RBC AUTO-ENTMCNC: 31.6 G/DL (ref 31.5–35.7)
MCV RBC AUTO: 93.5 FL (ref 79–97)
MONOCYTES # BLD AUTO: 0.88 10*3/MM3 (ref 0.1–0.9)
MONOCYTES NFR BLD AUTO: 9.1 % (ref 5–12)
NEUTROPHILS NFR BLD AUTO: 6.64 10*3/MM3 (ref 1.7–7)
NEUTROPHILS NFR BLD AUTO: 68.9 % (ref 42.7–76)
NRBC BLD AUTO-RTO: 0 /100 WBC (ref 0–0.2)
PLATELET # BLD AUTO: 225 10*3/MM3 (ref 140–450)
PMV BLD AUTO: 9.5 FL (ref 6–12)
POTASSIUM SERPL-SCNC: 4.5 MMOL/L (ref 3.5–5.2)
PROT SERPL-MCNC: 5.7 G/DL (ref 6–8.5)
RBC # BLD AUTO: 3.08 10*6/MM3 (ref 3.77–5.28)
SODIUM SERPL-SCNC: 139 MMOL/L (ref 136–145)
UNIT  ABO: NORMAL
UNIT  RH: NORMAL
WBC NRBC COR # BLD AUTO: 9.65 10*3/MM3 (ref 3.4–10.8)

## 2024-05-21 PROCEDURE — 83735 ASSAY OF MAGNESIUM: CPT | Performed by: FAMILY MEDICINE

## 2024-05-21 PROCEDURE — 97530 THERAPEUTIC ACTIVITIES: CPT | Performed by: OCCUPATIONAL THERAPIST

## 2024-05-21 PROCEDURE — 85025 COMPLETE CBC W/AUTO DIFF WBC: CPT | Performed by: FAMILY MEDICINE

## 2024-05-21 PROCEDURE — 76856 US EXAM PELVIC COMPLETE: CPT

## 2024-05-21 PROCEDURE — 80053 COMPREHEN METABOLIC PANEL: CPT | Performed by: FAMILY MEDICINE

## 2024-05-21 PROCEDURE — 97116 GAIT TRAINING THERAPY: CPT

## 2024-05-21 PROCEDURE — 97530 THERAPEUTIC ACTIVITIES: CPT

## 2024-05-21 PROCEDURE — 97535 SELF CARE MNGMENT TRAINING: CPT | Performed by: OCCUPATIONAL THERAPIST

## 2024-05-21 RX ORDER — ASPIRIN 81 MG/1
81 TABLET ORAL DAILY
Qty: 30 TABLET | Refills: 0 | Status: SHIPPED | OUTPATIENT
Start: 2024-05-21 | End: 2024-06-20

## 2024-05-21 RX ORDER — AMOXICILLIN 250 MG
2 CAPSULE ORAL 2 TIMES DAILY PRN
Start: 2024-05-21

## 2024-05-21 RX ORDER — BISACODYL 5 MG/1
5 TABLET, DELAYED RELEASE ORAL DAILY PRN
Start: 2024-05-21

## 2024-05-21 RX ORDER — ONDANSETRON 4 MG/1
4 TABLET, ORALLY DISINTEGRATING ORAL EVERY 4 HOURS PRN
Start: 2024-05-21

## 2024-05-21 RX ORDER — PSEUDOEPHEDRINE HCL 30 MG
100 TABLET ORAL 2 TIMES DAILY
Start: 2024-05-21

## 2024-05-21 RX ORDER — ACETAMINOPHEN 325 MG/1
650 TABLET ORAL EVERY 4 HOURS PRN
Start: 2024-05-21

## 2024-05-21 RX ORDER — POLYETHYLENE GLYCOL 3350 17 G/17G
17 POWDER, FOR SOLUTION ORAL DAILY PRN
Start: 2024-05-21

## 2024-05-21 RX ORDER — ALUMINA, MAGNESIA, AND SIMETHICONE 2400; 2400; 240 MG/30ML; MG/30ML; MG/30ML
15 SUSPENSION ORAL EVERY 6 HOURS PRN
Start: 2024-05-21

## 2024-05-21 RX ORDER — BISACODYL 10 MG
10 SUPPOSITORY, RECTAL RECTAL DAILY PRN
Start: 2024-05-21

## 2024-05-21 RX ORDER — CALCIUM CARBONATE 500 MG/1
2 TABLET, CHEWABLE ORAL 2 TIMES DAILY PRN
Start: 2024-05-21

## 2024-05-21 RX ORDER — HYDROCODONE BITARTRATE AND ACETAMINOPHEN 7.5; 325 MG/1; MG/1
1 TABLET ORAL EVERY 6 HOURS PRN
Start: 2024-05-21

## 2024-05-21 RX ADMIN — ESCITALOPRAM OXALATE 10 MG: 10 TABLET ORAL at 08:18

## 2024-05-21 RX ADMIN — HYDROCODONE BITARTRATE AND ACETAMINOPHEN 1 TABLET: 7.5; 325 TABLET ORAL at 07:10

## 2024-05-21 RX ADMIN — DOCUSATE SODIUM 100 MG: 100 CAPSULE, LIQUID FILLED ORAL at 08:18

## 2024-05-21 RX ADMIN — Medication 10 ML: at 08:18

## 2024-05-21 RX ADMIN — ASPIRIN 81 MG: 81 TABLET, COATED ORAL at 08:18

## 2024-05-21 RX ADMIN — MAGNESIUM HYDROXIDE 10 ML: 2400 SUSPENSION ORAL at 08:18

## 2024-05-21 NOTE — PLAN OF CARE
Problem: Adult Inpatient Plan of Care  Goal: Absence of Hospital-Acquired Illness or Injury  Outcome: Ongoing, Progressing  Intervention: Identify and Manage Fall Risk  Recent Flowsheet Documentation  Taken 5/21/2024 0800 by Kristi Anderson RN  Safety Promotion/Fall Prevention:   safety round/check completed   fall prevention program maintained   gait belt  Intervention: Prevent Skin Injury  Recent Flowsheet Documentation  Taken 5/21/2024 0800 by Kristi Anderson RN  Body Position: position changed independently  Skin Protection: adhesive use limited  Intervention: Prevent and Manage VTE (Venous Thromboembolism) Risk  Recent Flowsheet Documentation  Taken 5/21/2024 0800 by Kristi Anderson RN  Activity Management: ambulated in room  VTE Prevention/Management:   bilateral   sequential compression devices on  Range of Motion: active ROM (range of motion) encouraged  Intervention: Prevent Infection  Recent Flowsheet Documentation  Taken 5/21/2024 0800 by Kristi Anderson RN  Infection Prevention:   single patient room provided   hand hygiene promoted     Problem: Adult Inpatient Plan of Care  Goal: Absence of Hospital-Acquired Illness or Injury  Intervention: Prevent and Manage VTE (Venous Thromboembolism) Risk  Recent Flowsheet Documentation  Taken 5/21/2024 0800 by Kristi Anderson RN  Activity Management: ambulated in room  VTE Prevention/Management:   bilateral   sequential compression devices on  Range of Motion: active ROM (range of motion) encouraged     Problem: Adult Inpatient Plan of Care  Goal: Absence of Hospital-Acquired Illness or Injury  Intervention: Prevent Skin Injury  Recent Flowsheet Documentation  Taken 5/21/2024 0800 by Kristi Anderson RN  Body Position: position changed independently  Skin Protection: adhesive use limited   Goal Outcome Evaluation:  Plan of Care Reviewed With: patient        Progress: improving

## 2024-05-21 NOTE — CONSULTS
GYN consult regarding postmenopausal bleeding..    Patient mentions onset of bleeding today.  This is a first episode of vaginal bleeding that she is noted.  It has been painless.   Currently on no blood thinners other than low-dose aspirin    Scheduled for dismissal to rehab tomorrow.  I would suggest asked a GYN ultrasound for evaluation of the uterus and endometrial thickness.    The remainder of her evaluation can be performed on an outpatient basis

## 2024-05-21 NOTE — CASE MANAGEMENT/SOCIAL WORK
Continued Stay Note  RUPA Wilkinson     Patient Name: Irene Ospina  MRN: [id number]  Today's Date: 5/21/2024    Admit Date: 5/16/2024    Plan: Accepted to [facility].  Bed ready today 5.21.24.  No precert or PASRR needed.   Discharge Plan       Row Name 05/21/24 0925       Plan    Plan Accepted to [facility] rehab.  Bed ready today 5.21.24.  No precert or PASRR needed.    Plan Comments Dc barriers:  pod #3 right CHARISSA, pending US.                 Expected Discharge Date and Time       Expected Discharge Date Expected Discharge Time    May 21, 2024           Niesha Maria RN

## 2024-05-21 NOTE — PLAN OF CARE
Goal Outcome Evaluation:         Ambulating around room with x1 assist. Minimal complaints of pain, controlled with PO pain meds. Transvaginal US to be done in AM, US tech unable to perform tonight. Call light within reach, plan of care on going.

## 2024-05-21 NOTE — DISCHARGE SUMMARY
DATE/TIME OF ADMISSION:  5/16/2024 12:02 PM  Date of Discharge:  5/21/2024    Discharge Diagnosis: ACUTE RIGHT HIP FRACTURE POD #3---ORTHO CONSULTATION--SURGERY DONE  YESTERDAY WITH DR REGALADO; PAIN MANAGEMENT; PT; INPT REHAB AT [facility] OR [facility] PLANNED;CASE MANAGEMENT NOTIFIED;FOOT PUMPS  DOING WELL POST OP AND IS VERY MUCH PLEASED  ORTHOSTATIC HYPOTENSION---THOUGHT TO BE INTRAVASCULARLY DRY AND RESPONDED WELL TO HYDRATION; STABLE;  WILL GIVE A 1 LITER BOLUS OF NORMAL SALINE TODAY AND IV IRON REPLACEMENT; NOW, DOES QUALIFY FOR BLOOD TRANSFUSION; 1 UNIT OF BLOOD WILL BE GIVEN ;RECHECK IN THE AM WITH LABS---STABLE  C/O VAGINAL BLEEDING---GYN CONSULTED PRIMARILY DUE TO THE PESSARY AND MY CONCERN ABOUT EROSION OF THE VAGINAL WALL DUE TO THE PESSARY; WILL DO PELVIC ULTRASOUND BUT WILL NEED GYN EXAM DUE TO THE PESSARY AND IT MAY JUST NEED TO BE REMOVED AND LEFT OUT FOR A WHILE  PRIOR LEFT TOTAL HIP REPLACEMENT WITH ANTERIOR APPROACH---DID WELL.   OSTEOPOROSIS---ON PROLIA, VIT D, AND CALCIUM--LOW LEVEL HERE; WILL ENCOURAGE SUPPLEMENTATION  DYSLIPIDEMIA--NEAR GOAL  ALLERGIC ASTHMA---STABLE  MINNIE---USES CPAP REGULARLY  HYPOXIA WITH PAIN MEDS AND LAYING DOWN---O2 PRN PIPED IN TO THE CPAP; SHE DOES NOT REQUIRE THIS AT HOME  ANEMIA DUE TO INTRAOPERATIVE BLOOD LOSS---IV IRON X 2 DOSES; GOT  BLOOD YESTERDAY AS SHE NOW  MEETS REQUIREMENTS FOR TRANSFUSION; STABLE  VEGAN  FULL CODE STATUS    Presenting Problem/History of Present Illness  Active Hospital Problems    Diagnosis  POA    Post-menopausal bleeding [N95.0]  No    Iron deficiency anemia [D50.9]  No    Status post total hip replacement, right [Z96.641]  Not Applicable      Resolved Hospital Problems    Diagnosis Date Resolved POA    **Closed right hip fracture [S72.001A] 05/18/2024 Yes          Hospital Course  Irene Ospina is a 68 y.o. female who presents with RIGHT HIP FRACTURE S/P FALL IN HER OWN YARD. HAD ANTERIOR HIP REPLACEMENT BY DR REGALADO AND DID WELL. MILD POST OP  ANEMIA AND IRON , FLUIDS, AND EVENTUALLY ONE UNIT OF PRBC WAS GIVEN  STABLE LABS  HAD MILD VAGINAL BLEEDING AS POST MENOPAUSAL BLEEDING. HAS PESSARY IN PLACE; TRYING TO GET PELVIC ULTRASOUND DONE BEFORE DISCHARGE TODAY. MAY NEED TO REMOVE THE PESSARY AS VAGINAL WALL IRRITATION IS POSSIBLE. WILL NEED FOLLOW UP WITH GYN AS OUTPATIENT AND NEEDS EXAMINED, +/- ENDOMETRIAL BIOPSY      Procedures Performed    Procedure(s):  TOTAL HIP ARTHROPLASTY ANTERIOR  -------------------       Consults:   Consults       Date and Time Order Name Status Description    5/20/2024 12:23 PM Inpatient Gynecology Consult Completed     5/16/2024  2:21 PM Ortho (on-call MD unless specified) Completed     5/16/2024  1:20 PM Family Medicine Consult              Pertinent Test Results:    Lab Results (most recent)       Procedure Component Value Units Date/Time    Comprehensive Metabolic Panel [[id number]]  (Abnormal) Collected: 05/21/24 0622    Specimen: Blood Updated: 05/21/24 0646     Glucose 110 mg/dL      BUN 9 mg/dL      Creatinine 0.67 mg/dL      Sodium 139 mmol/L      Potassium 4.5 mmol/L      Chloride 102 mmol/L      CO2 31.0 mmol/L      Calcium 8.7 mg/dL      Total Protein 5.7 g/dL      Albumin 3.2 g/dL      ALT (SGPT) 43 U/L      AST (SGOT) 66 U/L      Alkaline Phosphatase 107 U/L      Total Bilirubin 0.6 mg/dL      Globulin 2.5 gm/dL      A/G Ratio 1.3 g/dL      BUN/Creatinine Ratio 13.4     Anion Gap 6.0 mmol/L      eGFR 94.2 mL/min/1.73     Narrative:      GFR Normal >60  Chronic Kidney Disease <60  Kidney Failure <15      Magnesium [[id number]]  (Abnormal) Collected: 05/21/24 0622    Specimen: Blood Updated: 05/21/24 0646     Magnesium 2.5 mg/dL     CBC & Differential [[id number]]  (Abnormal) Collected: 05/21/24 0622    Specimen: Blood Updated: 05/21/24 0624    Narrative:      The following orders were created for panel order CBC & Differential.  Procedure                               Abnormality         Status                      ---------                               -----------         ------                     CBC Auto Differential[[id number]]        Abnormal            Final result                 Please view results for these tests on the individual orders.    CBC Auto Differential [[id number]]  (Abnormal) Collected: 05/21/24 0622    Specimen: Blood Updated: 05/21/24 0624     WBC 9.65 10*3/mm3      RBC 3.08 10*6/mm3      Hemoglobin 9.1 g/dL      Hematocrit 28.8 %      MCV 93.5 fL      MCH 29.5 pg      MCHC 31.6 g/dL      RDW 12.8 %      RDW-SD 43.6 fl      MPV 9.5 fL      Platelets 225 10*3/mm3      Neutrophil % 68.9 %      Lymphocyte % 14.6 %      Monocyte % 9.1 %      Eosinophil % 5.9 %      Basophil % 0.6 %      Immature Grans % 0.9 %      Neutrophils, Absolute 6.64 10*3/mm3      Lymphocytes, Absolute 1.41 10*3/mm3      Monocytes, Absolute 0.88 10*3/mm3      Eosinophils, Absolute 0.57 10*3/mm3      Basophils, Absolute 0.06 10*3/mm3      Immature Grans, Absolute 0.09 10*3/mm3      nRBC 0.0 /100 WBC     Hemoglobin & Hematocrit, Blood [[id number]]  (Abnormal) Collected: 05/20/24 1252    Specimen: Blood Updated: 05/20/24 1301     Hemoglobin 8.6 g/dL      Hematocrit 27.2 %     Comprehensive Metabolic Panel [[id number]]  (Abnormal) Collected: 05/20/24 0715    Specimen: Blood Updated: 05/20/24 0741     Glucose 106 mg/dL      BUN 11 mg/dL      Creatinine 0.68 mg/dL      Sodium 135 mmol/L      Potassium 4.0 mmol/L      Chloride 100 mmol/L      CO2 29.0 mmol/L      Calcium 8.4 mg/dL      Total Protein 5.3 g/dL      Albumin 3.1 g/dL      ALT (SGPT) 33 U/L      AST (SGOT) 57 U/L      Alkaline Phosphatase 94 U/L      Total Bilirubin 0.4 mg/dL      Globulin 2.2 gm/dL      A/G Ratio 1.4 g/dL      BUN/Creatinine Ratio 16.2     Anion Gap 6.0 mmol/L      eGFR 93.8 mL/min/1.73     Narrative:      GFR Normal >60  Chronic Kidney Disease <60  Kidney Failure <15      Magnesium [[id number]]  (Normal) Collected: 05/20/24 0715    Specimen: Blood Updated:  05/20/24 0741     Magnesium 2.1 mg/dL     CBC & Differential [[id number]]  (Abnormal) Collected: 05/20/24 0715    Specimen: Blood Updated: 05/20/24 0725    Narrative:      The following orders were created for panel order CBC & Differential.  Procedure                               Abnormality         Status                     ---------                               -----------         ------                     CBC Auto Differential[[id number]]        Abnormal            Final result                 Please view results for these tests on the individual orders.    CBC Auto Differential [[id number]]  (Abnormal) Collected: 05/20/24 0715    Specimen: Blood Updated: 05/20/24 0725     WBC 10.83 10*3/mm3      RBC 2.72 10*6/mm3      Hemoglobin 8.0 g/dL      Hematocrit 25.4 %      MCV 93.4 fL      MCH 29.4 pg      MCHC 31.5 g/dL      RDW 12.0 %      RDW-SD 40.8 fl      MPV 9.9 fL      Platelets 192 10*3/mm3      Neutrophil % 69.6 %      Lymphocyte % 14.0 %      Monocyte % 10.7 %      Eosinophil % 4.6 %      Basophil % 0.5 %      Immature Grans % 0.6 %      Neutrophils, Absolute 7.53 10*3/mm3      Lymphocytes, Absolute 1.52 10*3/mm3      Monocytes, Absolute 1.16 10*3/mm3      Eosinophils, Absolute 0.50 10*3/mm3      Basophils, Absolute 0.05 10*3/mm3      Immature Grans, Absolute 0.07 10*3/mm3      nRBC 0.0 /100 WBC     Basic Metabolic Panel [[id number]]  (Abnormal) Collected: 05/19/24 0140    Specimen: Blood from Arm, Left Updated: 05/19/24 0228     Glucose 144 mg/dL      BUN 13 mg/dL      Creatinine 0.76 mg/dL      Sodium 138 mmol/L      Potassium 4.4 mmol/L      Chloride 102 mmol/L      CO2 26.0 mmol/L      Calcium 8.2 mg/dL      BUN/Creatinine Ratio 17.1     Anion Gap 10.0 mmol/L      eGFR 84.4 mL/min/1.73     Narrative:      GFR Normal >60  Chronic Kidney Disease <60  Kidney Failure <15      Basic Metabolic Panel [[id number]]  (Abnormal) Collected: 05/18/24 0159    Specimen: Blood from Arm, Right Updated: 05/18/24 0230      Glucose 105 mg/dL      BUN 12 mg/dL      Creatinine 0.73 mg/dL      Sodium 134 mmol/L      Potassium 4.7 mmol/L      Chloride 101 mmol/L      CO2 24.0 mmol/L      Calcium 8.2 mg/dL      BUN/Creatinine Ratio 16.4     Anion Gap 9.0 mmol/L      eGFR 88.6 mL/min/1.73     Narrative:      GFR Normal >60  Chronic Kidney Disease <60  Kidney Failure <15      Protime-INR [[id number]]  (Normal) Collected: 05/17/24 0347    Specimen: Blood from Arm, Left Updated: 05/17/24 0414     Protime 10.7 Seconds      INR 0.98    aPTT [[id number]]  (Normal) Collected: 05/17/24 0347    Specimen: Blood from Arm, Left Updated: 05/17/24 0414     PTT 26.1 seconds     Protime-INR [[id number]]  (Normal) Collected: 05/16/24 1359    Specimen: Blood Updated: 05/16/24 1417     Protime 10.3 Seconds      INR 0.94    aPTT [[id number]]  (Abnormal) Collected: 05/16/24 1359    Specimen: Blood Updated: 05/16/24 1417     PTT 21.7 seconds                   OK       Condition on Discharge:  GOOD    Vital Signs  Temp:  [97.4 °F (36.3 °C)-98.8 °F (37.1 °C)] 97.4 °F (36.3 °C)  Heart Rate:  [80-85] 82  Resp:  [10-19] 18  BP: ()/(44-72) 111/62    Physical Exam:        General Appearance:    Alert, cooperative, in no acute distress   Head:    Normocephalic, without obvious abnormality, atraumatic   Eyes:            Lids and lashes normal, conjunctivae and sclerae normal, no   icterus, no pallor, corneas clear, PERRLA   Ears:    Ears appear intact with no abnormalities noted   Throat:   No oral lesions, no thrush, oral mucosa moist   Neck:   No adenopathy, supple, trachea midline, no thyromegaly, no   carotid bruit, no JVD   Lungs:     Clear to auscultation,respirations regular, even and                  unlabored    Heart:    Regular rhythm and normal rate, normal S1 and S2, no            murmur, no gallop, no rub, no click   Chest Wall:    No abnormalities observed; PECTUS EXCAVATUM   Abdomen:     Normal bowel sounds, no masses, no organomegaly, soft         non-tender, non-distended, no guarding, no rebound                tenderness   Extremities:   Moves all extremities well, no edema, no cyanosis, no             redness; RIGHT GROIN WITH CLEAN BANDAGE IN PLACE   Pulses:   Pulses palpable and equal bilaterally   Skin:   No bleeding, bruising or rash   Lymph nodes:   No palpable adenopathy   Neurologic:   Cranial nerves 2 - 12 grossly intact, sensation intact, DTR       present and equal bilaterally       Discharge Disposition  TO [facility] REHAB  Rehab Facility or Unit (DC - External)    Discharge Medications     Discharge Medications        New Medications        Instructions Start Date   acetaminophen 325 MG tablet  Commonly known as: TYLENOL   650 mg, Oral, Every 4 Hours PRN      aluminum-magnesium hydroxide-simethicone 400-400-40 MG/5ML suspension  Commonly known as: MAALOX MAX   15 mL, Oral, Every 6 Hours PRN      aspirin 81 MG EC tablet   81 mg, Oral, Daily      bisacodyl 5 MG EC tablet  Commonly known as: DULCOLAX   5 mg, Oral, Daily PRN      bisacodyl 10 MG suppository  Commonly known as: DULCOLAX   10 mg, Rectal, Daily PRN      calcium carbonate 500 MG chewable tablet  Commonly known as: TUMS   2 tablets, Oral, 2 Times Daily PRN      docusate sodium 100 MG capsule   100 mg, Oral, 2 Times Daily      HYDROcodone-acetaminophen 7.5-325 MG per tablet  Commonly known as: NORCO   1 tablet, Oral, Every 6 Hours PRN      magnesium hydroxide 2400 MG/10ML suspension suspension  Commonly known as: MILK OF MAGNESIA   10 mL, Oral, Daily      ondansetron ODT 4 MG disintegrating tablet  Commonly known as: ZOFRAN-ODT   4 mg, Oral, Every 4 Hours PRN      polyethylene glycol 17 g packet  Commonly known as: MIRALAX   17 g, Oral, Daily PRN      sennosides-docusate 8.6-50 MG per tablet  Commonly known as: PERICOLACE   2 tablets, Oral, 2 Times Daily PRN             Continue These Medications        Instructions Start Date   escitalopram 10 MG tablet  Commonly known as: LEXAPRO   10 mg,  Oral, Daily               Discharge Diet: VEGAN    Activity at Discharge: UP WITH WALKER OR CANE    Follow-up Appointments  AS SCHEDULED WITH DR STEARNS  ONE WEEK AFTER REHAB DISCHARGE WITH DR NASCIMENTO VIRTUALLY  FOLLOW UP WITH GYN TO EVALUATE FOR THE VAGINAL BLEEDING---ULTRASOUND ORDERED FOR TODAY  No future appointments.      Test Results Pending at Discharge  NONE       Brynn Nascimento MD  05/21/24  07:34 EDT

## 2024-05-21 NOTE — THERAPY TREATMENT NOTE
"Subjective: Pt agreeable to therapeutic plan of care.    Objective:     Bed mobility - Min-A to move RLE to off EOB and pt elevated HOB.    Transfers - CGA and with rolling walker to stand from EOB with RLE \"kicked out\" in front of pt    Ambulation - 20 feet CGA and with rolling walker in room-- distance mainly limited by report of dizziness.    Therapeutic Exercise - 10 Reps R Lower Extremity AROM supported sitting / chair (ankle pumps and LAQs)    WB'ing status: R Lower Extremity Weight Bearing As Tolerated    Precautions: Anterior hip precautions     Vitals: Hypotensive; Supine GQ=666/45; Standing BI=561/50    Pain: 3 VAS   Location: R hip  Intervention for pain: Repositioned, RN notified, and Increased Activity    Education: Provided education on the importance of mobility in the acute care setting, Verbal/Tactile Cues, Transfer Training, Gait Training, WB'ing status, and Post-Op Precautions    Assessment: Irene Ospina is POD#3 R anterior CHARISSA. Mobility has primarily been limited by pain, additional medical complications requiring OB/GYN consult, and hypotension. Pt did increase gait distance significantly today. Pt amb 20 ft in room with Rwx and CGA but still mildly symptomatic of hypotension. Will continue to follow and progress as tolerated.    Plan/Recommendations:   If medically appropriate, High Intensity Therapy recommended post-acute care. This is recommended as therapy feels the patient would require 5-6 days per week, 2-3 hours per day. At this time, inpatient rehabilitation (acute rehab) would be the first choice and SNF would be second. Pt requires no DME at discharge.     Pt desires Inpatient Rehabilitation placement at discharge. Pt cooperative; agreeable to therapeutic recommendations and plan of care.         Basic Mobility 6-click:  Rollin = Total, A lot = 2, A little = 3; 4 = None  Supine>Sit:   1 = Total, A lot = 2, A little = 3; 4 = None   Sit>Stand with arms:  1 = Total, A lot = " 2, A little = 3; 4 = None  Bed>Chair:   1 = Total, A lot = 2, A little = 3; 4 = None  Ambulate in room:  1 = Total, A lot = 2, A little = 3; 4 = None  3-5 Steps with railin = Total, A lot = 2, A little = 3; 4 = None  Score: 17    Modified [place]: N/A = No pre-op stroke/TIA    Post-Tx Position: Up in Chair  PPE: gloves

## 2024-05-21 NOTE — PLAN OF CARE
Goal Outcome Evaluation:           Assessment: Irene Ospina presents with ADL impairments affecting function including balance, endurance / activity tolerance, pain, and strength. Demonstrated functioning below baseline abilities indicate the need for continued skilled intervention while inpatient. Tolerating session today without incident. Will continue to follow and progress as tolerated.     Plan/Recommendations:   High Intensity Therapy recommended post-acute care. This is recommended as therapy feels the patient would require 5-6 days per week, 2-3 hours per day. At this time, inpatient rehabilitation (acute rehab) would be the first choice and SNF would be second.. Pt requires no DME at discharge.     Pt desires Inpatient Rehabilitation placement at discharge. Pt cooperative; agreeable to therapeutic recommendations and plan of care.

## 2024-05-21 NOTE — PLAN OF CARE
Assessment: Irene Ospina is POD#3 R anterior CHARISSA. Mobility has primarily been limited by pain, additional medical complications requiring OB/GYN consult, and hypotension. Pt did increase gait distance significantly today. Pt amb 20 ft in room with Rwx and CGA but still mildly symptomatic of hypotension. Will continue to follow and progress as tolerated.

## 2024-05-21 NOTE — THERAPY TREATMENT NOTE
Subjective: Pt agreeable to therapeutic plan of care.  Cognition: oriented to Person, Place, Time, and Situation    Objective:     Bed Mobility: Min-A   Functional Transfers: CGA and with rolling walker     Balance: dynamic sitting EOB CGA; standing = CGA with RW   Functional Ambulation: CGA and with rolling walker    Grooming: Modified-Independent  ADL Position: supported sitting  ADL Comments:     Vitals: mild hypotension  113/45    Pain: 3 VAS  Location: right hip  Interventions for pain: Increased Activity and Therapeutic Presence  Education: Provided education on the importance of mobility in the acute care setting, Verbal/Tactile Cues, and ADL training      Assessment: Irene Ospina presents with ADL impairments affecting function including balance, endurance / activity tolerance, pain, and strength. Demonstrated functioning below baseline abilities indicate the need for continued skilled intervention while inpatient. Tolerating session today without incident. Will continue to follow and progress as tolerated.     Plan/Recommendations:   High Intensity Therapy recommended post-acute care. This is recommended as therapy feels the patient would require 5-6 days per week, 2-3 hours per day. At this time, inpatient rehabilitation (acute rehab) would be the first choice and SNF would be second.. Pt requires no DME at discharge.     Pt desires Inpatient Rehabilitation placement at discharge. Pt cooperative; agreeable to therapeutic recommendations and plan of care.     Modified [place]: N/A = No pre-op stroke/TIA    Post-Tx Position: Up in Chair, Alarms activated, and Call light and personal items within reach  PPE: gloves

## 2024-05-22 NOTE — CASE MANAGEMENT/SOCIAL WORK
Case Management Discharge Note      Final Note: [facility]    Provided Post Acute Provider List?: Yes  Post Acute Provider List: Home Health  Delivered To: Patient  Method of Delivery: In person    Selected Continued Care - Discharged on 5/21/2024 Admission date: 5/16/2024 - Discharge disposition: Rehab Facility or Unit (DC - External)      Destination Coordination complete.      Service Provider Selected Services Address Phone Fax Patient Preferred    [facility] [address] [place] [address] [phone] --                    Transportation Services  W/C Van: Other ([facility])    Final Discharge Disposition Code: 62 - inpatient rehab facility

## 2024-08-02 ENCOUNTER — HOSPITAL ENCOUNTER (OUTPATIENT)
Facility: HOSPITAL | Age: 69
Discharge: HOME | End: 2024-08-02
Attending: EMERGENCY MEDICINE | Admitting: EMERGENCY MEDICINE
Payer: MEDICARE

## 2024-08-02 VITALS
RESPIRATION RATE: 18 BRPM | WEIGHT: 153 LBS | SYSTOLIC BLOOD PRESSURE: 138 MMHG | HEART RATE: 91 BPM | DIASTOLIC BLOOD PRESSURE: 73 MMHG | HEIGHT: 63 IN | OXYGEN SATURATION: 94 % | BODY MASS INDEX: 27.11 KG/M2 | TEMPERATURE: 98.5 F

## 2024-08-02 DIAGNOSIS — K08.89 PAIN, DENTAL: Primary | ICD-10-CM

## 2024-08-02 LAB
FLUAV SUBTYP SPEC NAA+PROBE: NOT DETECTED
FLUBV RNA ISLT QL NAA+PROBE: NOT DETECTED
SARS-COV-2 RNA RESP QL NAA+PROBE: NOT DETECTED

## 2024-08-02 PROCEDURE — 87636 SARSCOV2 & INF A&B AMP PRB: CPT

## 2024-08-02 PROCEDURE — G0463 HOSPITAL OUTPT CLINIC VISIT: HCPCS | Performed by: EMERGENCY MEDICINE

## 2024-08-02 PROCEDURE — 99213 OFFICE O/P EST LOW 20 MIN: CPT | Performed by: EMERGENCY MEDICINE

## 2024-08-02 RX ORDER — PENICILLIN V POTASSIUM 500 MG/1
500 TABLET ORAL 4 TIMES DAILY
Qty: 40 TABLET | Refills: 0 | Status: SHIPPED | OUTPATIENT
Start: 2024-08-02 | End: 2024-08-12

## 2024-08-02 RX ORDER — CHLORHEXIDINE GLUCONATE ORAL RINSE 1.2 MG/ML
15 SOLUTION DENTAL 4 TIMES DAILY
Qty: 473 ML | Refills: 0 | Status: SHIPPED | OUTPATIENT
Start: 2024-08-02

## 2024-08-02 RX ORDER — AMOXICILLIN 250 MG/1
500 CAPSULE ORAL ONCE
Status: COMPLETED | OUTPATIENT
Start: 2024-08-02 | End: 2024-08-02

## 2024-08-02 RX ADMIN — AMOXICILLIN 500 MG: 250 CAPSULE ORAL at 22:09

## 2024-08-03 NOTE — FSED PROVIDER NOTE
Subjective   History of Present Illness  Patient is 69-year-old woman presents complaining of right upper dental pain which began approximately 1 to 2 weeks ago.  She had fractured her tooth several weeks ago.  She has since had general malaise with decreased activity level and increased weakness.  No vomiting or diarrhea or chest pain or shortness of breath.  No cough or URI symptoms.  No facial swelling or purulent drainage from tooth or gums.    Review of Systems    Past Medical History:   Diagnosis Date    Asthma     Hyperlipidemia     Iron deficiency anemia 05/21/2024    ALSO DUE TO POST OPERATIVE BLOOD LOSS      Post-menopausal bleeding 05/21/2024       Allergies   Allergen Reactions    Ketorolac Tromethamine Anaphylaxis    Codeine Hallucinations       Past Surgical History:   Procedure Laterality Date    HIP SURGERY Left     TOTAL HIP ARTHROPLASTY Right 5/18/2024    Procedure: TOTAL HIP ARTHROPLASTY ANTERIOR;  Surgeon: Teri Hansen MD;  Location: [facility] OR;  Service: Orthopedics;  Laterality: Right;       History reviewed. No pertinent family history.    Social History     Socioeconomic History    Marital status:    Tobacco Use    Smoking status: Never     Passive exposure: Never    Smokeless tobacco: Never   Vaping Use    Vaping status: Never Used   Substance and Sexual Activity    Alcohol use: Yes     Comment: weekly    Drug use: Never    Sexual activity: Defer           Objective   Physical Exam  Vitals and nursing note reviewed.   Constitutional:       General: She is not in acute distress.     Appearance: Normal appearance. She is not ill-appearing or toxic-appearing.   HENT:      Head: Normocephalic and atraumatic.      Comments: No facial swelling or erythema.     Nose: Nose normal.      Mouth/Throat:      Mouth: Mucous membranes are moist.      Comments: Right upper dental tenderness.  No gum swelling.  No evidence of dental abscess.  Eyes:      Extraocular Movements:  Extraocular movements intact.   Cardiovascular:      Rate and Rhythm: Normal rate and regular rhythm.      Pulses: Normal pulses.      Heart sounds: Normal heart sounds.   Pulmonary:      Effort: Pulmonary effort is normal.      Breath sounds: Normal breath sounds.   Abdominal:      Palpations: Abdomen is soft.      Tenderness: There is no abdominal tenderness.   Musculoskeletal:         General: Normal range of motion.      Cervical back: Normal range of motion.   Skin:     General: Skin is warm.      Capillary Refill: Capillary refill takes less than 2 seconds.   Neurological:      General: No focal deficit present.      Mental Status: She is alert.         Procedures           ED Course                                           Medical Decision Making  Problems Addressed:  Pain, dental: complicated acute illness or injury    Risk  Prescription drug management.    Patient 69-year-old woman who had fractured her right upper dental tooth approximately several weeks ago.  Over the past 1 week she has had increasing dental pain and subjective fevers with general malaise over the past several days.  She has had no vomiting or diarrhea.  No chest pain or shortness of breath or difficulty swallowing.  On examination she appears to be in no distress.  She does have right upper dental tenderness but no gum swelling.  Patient will be placed on Peridex solution and Pen-Vee K 500 mg 4 times a day for 10 days.  COVID and flu test were obtained which were negative.    Final diagnoses:   Pain, dental       ED Disposition  ED Disposition       ED Disposition   Discharge    Condition   Stable    Comment   --               Brynn Galicia MD  [address] [place] [address] [phone]    In 1 week      [place] [address] [place] [id number] [phone]    If symptoms worsen         Medication List        New Prescriptions      chlorhexidine 0.12 %  solution  Commonly known as: PERIDEX  Apply 15 mL to the mouth or throat 4 (Four) Times a Day.     penicillin v potassium 500 MG tablet  Commonly known as: VEETID  Take 1 tablet by mouth 4 (Four) Times a Day for 10 days.               Where to Get Your Medications        These medications were sent to [facility] [id number] - [place], [place] - [address] AT [facility] - [phone]  - [phone]   [address] [place] [address]      Phone: [phone]   chlorhexidine 0.12 % solution  penicillin v potassium 500 MG tablet

## 2024-08-03 NOTE — DISCHARGE INSTRUCTIONS
Take medications as prescribed.  Rinse with Peridex solution 2-4 times a day and then spit out.  Follow-up with her dentist.  Seek immediate medical attention if having worsening symptoms or any concerns.

## 2024-08-03 NOTE — ED NOTES
While assessing patient, patient states she's just overall tired. Per  and patient, patient just ended a 6 hour drive from [place].

## 2024-08-15 ENCOUNTER — TRANSCRIBE ORDERS (OUTPATIENT)
Dept: ADMINISTRATIVE | Facility: HOSPITAL | Age: 69
End: 2024-08-15
Payer: MEDICARE

## 2024-08-15 ENCOUNTER — LAB (OUTPATIENT)
Dept: LAB | Facility: HOSPITAL | Age: 69
End: 2024-08-15
Payer: MEDICARE

## 2024-08-15 DIAGNOSIS — F34.1 DYSTHYMIC DISORDER: ICD-10-CM

## 2024-08-15 DIAGNOSIS — G47.33 OBSTRUCTIVE SLEEP APNEA (ADULT) (PEDIATRIC): ICD-10-CM

## 2024-08-15 DIAGNOSIS — J45.909 ALLERGIC ASTHMA WITH STATED CAUSE: ICD-10-CM

## 2024-08-15 DIAGNOSIS — Z90.5 ACQUIRED ABSENCE OF KIDNEY: ICD-10-CM

## 2024-08-15 DIAGNOSIS — Z96.649: ICD-10-CM

## 2024-08-15 DIAGNOSIS — E78.81 LIPOID DERMATOARTHRITIS: ICD-10-CM

## 2024-08-15 DIAGNOSIS — E78.81 LIPOID DERMATOARTHRITIS: Primary | ICD-10-CM

## 2024-08-15 LAB
25(OH)D3 SERPL-MCNC: 40.9 NG/ML (ref 30–100)
ALBUMIN SERPL-MCNC: 4.2 G/DL (ref 3.5–5.2)
ALBUMIN/GLOB SERPL: 1.8 G/DL
ALP SERPL-CCNC: 105 U/L (ref 39–117)
ALT SERPL W P-5'-P-CCNC: 10 U/L (ref 1–33)
ANION GAP SERPL CALCULATED.3IONS-SCNC: 10.4 MMOL/L (ref 5–15)
AST SERPL-CCNC: 18 U/L (ref 1–32)
BASOPHILS # BLD AUTO: 0.07 10*3/MM3 (ref 0–0.2)
BASOPHILS NFR BLD AUTO: 1.2 % (ref 0–1.5)
BILIRUB SERPL-MCNC: 0.3 MG/DL (ref 0–1.2)
BUN SERPL-MCNC: 9 MG/DL (ref 8–23)
BUN/CREAT SERPL: 11.3 (ref 7–25)
CALCIUM SPEC-SCNC: 9.8 MG/DL (ref 8.6–10.5)
CHLORIDE SERPL-SCNC: 104 MMOL/L (ref 98–107)
CHOLEST SERPL-MCNC: 185 MG/DL (ref 0–200)
CO2 SERPL-SCNC: 26.6 MMOL/L (ref 22–29)
CREAT SERPL-MCNC: 0.8 MG/DL (ref 0.57–1)
DEPRECATED RDW RBC AUTO: 37.7 FL (ref 37–54)
EGFRCR SERPLBLD CKD-EPI 2021: 78.9 ML/MIN/1.73
EOSINOPHIL # BLD AUTO: 0.23 10*3/MM3 (ref 0–0.4)
EOSINOPHIL NFR BLD AUTO: 3.9 % (ref 0.3–6.2)
ERYTHROCYTE [DISTWIDTH] IN BLOOD BY AUTOMATED COUNT: 11.8 % (ref 12.3–15.4)
GLOBULIN UR ELPH-MCNC: 2.3 GM/DL
GLUCOSE SERPL-MCNC: 85 MG/DL (ref 65–99)
HCT VFR BLD AUTO: 34.2 % (ref 34–46.6)
HDLC SERPL-MCNC: 47 MG/DL (ref 40–60)
HGB BLD-MCNC: 11.7 G/DL (ref 12–15.9)
IMM GRANULOCYTES # BLD AUTO: 0.01 10*3/MM3 (ref 0–0.05)
IMM GRANULOCYTES NFR BLD AUTO: 0.2 % (ref 0–0.5)
LDLC SERPL CALC-MCNC: 107 MG/DL (ref 0–100)
LDLC/HDLC SERPL: 2.18 {RATIO}
LYMPHOCYTES # BLD AUTO: 1.88 10*3/MM3 (ref 0.7–3.1)
LYMPHOCYTES NFR BLD AUTO: 31.9 % (ref 19.6–45.3)
MCH RBC QN AUTO: 30.9 PG (ref 26.6–33)
MCHC RBC AUTO-ENTMCNC: 34.2 G/DL (ref 31.5–35.7)
MCV RBC AUTO: 90.2 FL (ref 79–97)
MONOCYTES # BLD AUTO: 0.61 10*3/MM3 (ref 0.1–0.9)
MONOCYTES NFR BLD AUTO: 10.3 % (ref 5–12)
NEUTROPHILS NFR BLD AUTO: 3.1 10*3/MM3 (ref 1.7–7)
NEUTROPHILS NFR BLD AUTO: 52.5 % (ref 42.7–76)
NRBC BLD AUTO-RTO: 0 /100 WBC (ref 0–0.2)
PLATELET # BLD AUTO: 351 10*3/MM3 (ref 140–450)
PMV BLD AUTO: 9.4 FL (ref 6–12)
POTASSIUM SERPL-SCNC: 4.7 MMOL/L (ref 3.5–5.2)
PROT SERPL-MCNC: 6.5 G/DL (ref 6–8.5)
RBC # BLD AUTO: 3.79 10*6/MM3 (ref 3.77–5.28)
SODIUM SERPL-SCNC: 141 MMOL/L (ref 136–145)
TRIGL SERPL-MCNC: 177 MG/DL (ref 0–150)
TSH SERPL DL<=0.05 MIU/L-ACNC: 2.28 UIU/ML (ref 0.27–4.2)
VLDLC SERPL-MCNC: 31 MG/DL (ref 5–40)
WBC NRBC COR # BLD AUTO: 5.9 10*3/MM3 (ref 3.4–10.8)

## 2024-08-15 PROCEDURE — 80061 LIPID PANEL: CPT

## 2024-08-15 PROCEDURE — 80053 COMPREHEN METABOLIC PANEL: CPT

## 2024-08-15 PROCEDURE — 82306 VITAMIN D 25 HYDROXY: CPT

## 2024-08-15 PROCEDURE — 85025 COMPLETE CBC W/AUTO DIFF WBC: CPT

## 2024-08-15 PROCEDURE — 84443 ASSAY THYROID STIM HORMONE: CPT

## 2024-08-15 PROCEDURE — 36415 COLL VENOUS BLD VENIPUNCTURE: CPT

## 2024-09-22 ENCOUNTER — HOSPITAL ENCOUNTER (OUTPATIENT)
Facility: HOSPITAL | Age: 69
Discharge: HOME | End: 2024-09-22
Attending: EMERGENCY MEDICINE
Payer: MEDICARE

## 2024-09-22 ENCOUNTER — APPOINTMENT (OUTPATIENT)
Dept: GENERAL RADIOLOGY | Facility: HOSPITAL | Age: 69
End: 2024-09-22
Payer: MEDICARE

## 2024-09-22 VITALS
WEIGHT: 148 LBS | HEART RATE: 92 BPM | SYSTOLIC BLOOD PRESSURE: 119 MMHG | BODY MASS INDEX: 26.22 KG/M2 | HEIGHT: 63 IN | RESPIRATION RATE: 18 BRPM | OXYGEN SATURATION: 96 % | DIASTOLIC BLOOD PRESSURE: 68 MMHG | TEMPERATURE: 98.8 F

## 2024-09-22 DIAGNOSIS — S90.31XA CONTUSION OF RIGHT FOOT, INITIAL ENCOUNTER: Primary | ICD-10-CM

## 2024-09-22 PROCEDURE — G0463 HOSPITAL OUTPT CLINIC VISIT: HCPCS | Performed by: NURSE PRACTITIONER

## 2024-09-22 PROCEDURE — 73630 X-RAY EXAM OF FOOT: CPT

## 2024-09-22 PROCEDURE — 99213 OFFICE O/P EST LOW 20 MIN: CPT | Performed by: NURSE PRACTITIONER

## 2024-09-22 NOTE — FSED PROVIDER NOTE
Subjective   History of Present Illness  The patient is a 69-year-old female who presents to the ER with pain in the right foot and toes after dropping a table on her foot approximately week ago.    History provided by:  Patient   used: No        Review of Systems   Musculoskeletal:         Pain in the right foot after dropping a table on her foot approximately a week ago.       Past Medical History:   Diagnosis Date    Asthma     Hyperlipidemia     Iron deficiency anemia 05/21/2024    ALSO DUE TO POST OPERATIVE BLOOD LOSS      Post-menopausal bleeding 05/21/2024       Allergies   Allergen Reactions    Ketorolac Tromethamine Anaphylaxis    Codeine Hallucinations       Past Surgical History:   Procedure Laterality Date    HIP SURGERY Left     TOTAL HIP ARTHROPLASTY Right 5/18/2024    Procedure: TOTAL HIP ARTHROPLASTY ANTERIOR;  Surgeon: Teri Hansen MD;  Location: [facility];  Service: Orthopedics;  Laterality: Right;       No family history on file.    Social History     Socioeconomic History    Marital status:    Tobacco Use    Smoking status: Never     Passive exposure: Never    Smokeless tobacco: Never   Vaping Use    Vaping status: Never Used   Substance and Sexual Activity    Alcohol use: Yes     Comment: weekly    Drug use: Never    Sexual activity: Defer           Objective   Physical Exam  Vitals and nursing note reviewed.   Constitutional:       Appearance: Normal appearance.   HENT:      Right Ear: Tympanic membrane and ear canal normal.      Left Ear: Tympanic membrane and ear canal normal.      Nose: Nose normal.   Musculoskeletal:         General: Tenderness and signs of injury present.        Feet:    Feet:      Comments: Patient with pain on palpation of the right second and third toe area.  Patient has mild bruising noted.  Patient has cap refill less than 3 seconds.  Patient has positive pedal and posttibial pulses noted.  Skin:     General: Skin is warm  and dry.   Neurological:      General: No focal deficit present.      Mental Status: She is alert and oriented to person, place, and time.   Psychiatric:         Mood and Affect: Mood normal.         Behavior: Behavior normal. Behavior is cooperative.         Procedures           ED Course  ED Course as of 09/22/24 1639   Sun Sep 22, 2024   1630 XR FOOT 3+ VW RIGHT     Date of Exam: 9/22/2024 3:40 PM EDT     Indication: right foot injury     Comparison: None available.     Findings:  There is a hallux valgus deformity. There is no fracture. There is no radiopaque foreign body. No concerning sclerotic or lytic lesions are seen. There are some degenerative change involving the first metatarsal phalangeal joint space area.     IMPRESSION:  Impression:  1.Some degenerative change involving the first digit.   [DS]      ED Course User Index  [DS] Sanaz Wong APRN                                           Medical Decision Making  Patient reports she dropped a table on her right foot approximately week ago and has had pain ever since.  Patient with pain on palpation of the right second and third toe area.  Patient has mild bruising noted.  Patient has cap refill less than 3 seconds.  Patient has positive pedal and posttibial pulses noted.    Differential diagnosis includes foot fracture, foot contusion, toe fractures, toe contusions.    X-ray of the right foot is unremarkable.    Advised patient to ice and elevate, use Tylenol/Motrin as needed.  Advised patient if she continues to have pain she needs to follow-up with primary care/podiatry for further evaluation and treatment        Amount and/or Complexity of Data Reviewed  Radiology: ordered. Decision-making details documented in ED Course.    Risk  OTC drugs.        Final diagnoses:   Contusion of right foot, initial encounter       ED Disposition  ED Disposition       ED Disposition   Discharge    Condition   Stable    Comment   --               Brynn Galicia  MD MONICA  [address] [facility] [address]  [place] [address] [phone]    Schedule an appointment as soon as possible for a visit in 1 week  As needed, If symptoms worsen    RADHA More DPM  [address] [place] [address] [phone]    Schedule an appointment as soon as possible for a visit in 1 week  As needed, If symptoms worsen         Medication List      No changes were made to your prescriptions during this visit.

## 2024-09-22 NOTE — DISCHARGE INSTRUCTIONS
You can also follow-up with  podiatry, Dr. Mayers,  if you continue to have pain. Make sure your insurance does not require a speciality referral before seeing, if so you need to see PCP     Ice and elevate to help with pain and swelling.     Tylenol/Motrin as needed for pain/fevers    Make sure patient is drinking plenty of fluids.    Return for any new or worsening symptoms.

## (undated) DEVICE — SOLUTION,WATER,IRRIGATION,1000ML,STERILE: Brand: MEDLINE

## (undated) DEVICE — CUFF SCD HEMOFORCE SEQ CALF STD MD

## (undated) DEVICE — KT SURG TURNOVER 050

## (undated) DEVICE — PENCL SMOKE/EVAC MEGADYNE TELESCP 15FT

## (undated) DEVICE — ANTIBACTERIAL UNDYED BRAIDED (POLYGLACTIN 910), SYNTHETIC ABSORBABLE SUTURE: Brand: COATED VICRYL

## (undated) DEVICE — NDL SPINE 20G 3 1/2 YEL STRL 1P/U

## (undated) DEVICE — SOL IRRIG NACL 1000ML

## (undated) DEVICE — SYR ANGIO FNGR FIX CONTRL MLL 10ML

## (undated) DEVICE — PAD, GROUNDING, UNIVERSAL, SPLIT, 9': Brand: MEDLINE

## (undated) DEVICE — 3M™ STERI-STRIP™ REINFORCED ADHESIVE SKIN CLOSURES, R1547, 1/2 IN X 4 IN (12 MM X 100 MM), 6 STRIPS/ENVELOPE: Brand: 3M™ STERI-STRIP™

## (undated) DEVICE — SOL ANTISEP SCRB HIBICLENS WD/NK W/PUMP CHG/4PCT 4OZ

## (undated) DEVICE — C-ARM: Brand: UNBRANDED

## (undated) DEVICE — DRSNG SLVR/ANTIBAC PRIMASEAL POST/OP ADHS 3.5X10IN

## (undated) DEVICE — 6617 IOBAN II PATIENT ISOLATION DRAPE 5/BX,4BX/CS: Brand: STERI-DRAPE™ IOBAN™ 2

## (undated) DEVICE — GLV SURG BIOGEL ECLPS LTX PF 7.5

## (undated) DEVICE — SHEET, DRAPE, SPLIT, STERILE: Brand: MEDLINE

## (undated) DEVICE — DRAPE,TOWEL,LARGE,INVISISHIELD: Brand: MEDLINE

## (undated) DEVICE — SYS CLS SKIN PREMIERPRO EXOFINFUSION 22CM

## (undated) DEVICE — DECANTER: Brand: UNBRANDED

## (undated) DEVICE — PK TOTL HIP 50

## (undated) DEVICE — UNDERGLV SURG BIOGEL INDICAT PF 8 GRN

## (undated) DEVICE — SOL ISO/ALC RUB 70PCT 4OZ

## (undated) DEVICE — KT PT POSITION SUPINE HANA/PROFX TABL

## (undated) DEVICE — GLV SURG SIGNATURE ESSENTIAL PF LTX SZ8.5

## (undated) DEVICE — IRRIGATOR BULB ASEPTO 60CC STRL

## (undated) DEVICE — DUAL CUT SAGITTAL BLADE

## (undated) DEVICE — GLV SURG SENSICARE PI ORTHO SZ8 LF STRL

## (undated) DEVICE — GLV SURG SENSICARE PI ORTHO PF SZ7 LF STRL